# Patient Record
Sex: FEMALE | Race: WHITE
[De-identification: names, ages, dates, MRNs, and addresses within clinical notes are randomized per-mention and may not be internally consistent; named-entity substitution may affect disease eponyms.]

---

## 2017-01-02 ENCOUNTER — HOSPITAL ENCOUNTER (OUTPATIENT)
Dept: HOSPITAL 45 - C.LAB1850 | Age: 82
Discharge: HOME | End: 2017-01-02
Attending: INTERNAL MEDICINE
Payer: COMMERCIAL

## 2017-01-02 DIAGNOSIS — I48.91: ICD-10-CM

## 2017-01-02 DIAGNOSIS — I50.9: Primary | ICD-10-CM

## 2017-01-02 LAB
ANION GAP SERPL CALC-SCNC: 8 MMOL/L (ref 3–11)
B-OH-BUTYR SERPL-SCNC: 1.12 MG/DL (ref 0.2–2.81)
BUN SERPL-MCNC: 18 MG/DL (ref 7–18)
BUN/CREAT SERPL: 16.1 (ref 10–20)
CALCIUM SERPL-MCNC: 9.2 MG/DL (ref 8.5–10.1)
CHLORIDE SERPL-SCNC: 99 MMOL/L (ref 98–107)
CO2 SERPL-SCNC: 33 MMOL/L (ref 21–32)
CREAT SERPL-MCNC: 1.1 MG/DL (ref 0.6–1.2)
GLUCOSE SERPL-MCNC: 364 MG/DL (ref 70–99)
MAGNESIUM SERPL-MCNC: 2.1 MG/DL (ref 1.8–2.4)
POTASSIUM SERPL-SCNC: 4.1 MMOL/L (ref 3.5–5.1)
SODIUM SERPL-SCNC: 140 MMOL/L (ref 136–145)

## 2017-01-19 ENCOUNTER — HOSPITAL ENCOUNTER (OUTPATIENT)
Dept: HOSPITAL 45 - C.CATH | Age: 82
Discharge: HOME | End: 2017-01-19
Attending: INTERNAL MEDICINE
Payer: COMMERCIAL

## 2017-01-19 DIAGNOSIS — E11.9: ICD-10-CM

## 2017-01-19 DIAGNOSIS — Z53.8: ICD-10-CM

## 2017-01-19 DIAGNOSIS — I10: ICD-10-CM

## 2017-01-19 DIAGNOSIS — I48.91: Primary | ICD-10-CM

## 2017-01-23 ENCOUNTER — HOSPITAL ENCOUNTER (OUTPATIENT)
Dept: HOSPITAL 45 - C.CATH | Age: 82
Discharge: HOME | End: 2017-01-23
Attending: INTERNAL MEDICINE
Payer: COMMERCIAL

## 2017-01-23 VITALS
OXYGEN SATURATION: 93 % | TEMPERATURE: 97.34 F | HEART RATE: 124 BPM | DIASTOLIC BLOOD PRESSURE: 125 MMHG | SYSTOLIC BLOOD PRESSURE: 170 MMHG

## 2017-01-23 VITALS — DIASTOLIC BLOOD PRESSURE: 98 MMHG | OXYGEN SATURATION: 92 % | SYSTOLIC BLOOD PRESSURE: 139 MMHG | HEART RATE: 121 BPM

## 2017-01-23 VITALS — DIASTOLIC BLOOD PRESSURE: 115 MMHG | HEART RATE: 130 BPM | SYSTOLIC BLOOD PRESSURE: 185 MMHG | OXYGEN SATURATION: 99 %

## 2017-01-23 VITALS
WEIGHT: 200.62 LBS | HEIGHT: 67.99 IN | BODY MASS INDEX: 30.41 KG/M2 | BODY MASS INDEX: 30.41 KG/M2 | HEIGHT: 67.99 IN | WEIGHT: 200.62 LBS | BODY MASS INDEX: 30.41 KG/M2

## 2017-01-23 VITALS — DIASTOLIC BLOOD PRESSURE: 78 MMHG | SYSTOLIC BLOOD PRESSURE: 135 MMHG | HEART RATE: 71 BPM | OXYGEN SATURATION: 92 %

## 2017-01-23 VITALS — DIASTOLIC BLOOD PRESSURE: 93 MMHG | OXYGEN SATURATION: 93 % | HEART RATE: 62 BPM | SYSTOLIC BLOOD PRESSURE: 166 MMHG

## 2017-01-23 VITALS — HEART RATE: 127 BPM | SYSTOLIC BLOOD PRESSURE: 160 MMHG | OXYGEN SATURATION: 99 % | DIASTOLIC BLOOD PRESSURE: 128 MMHG

## 2017-01-23 VITALS — DIASTOLIC BLOOD PRESSURE: 119 MMHG | OXYGEN SATURATION: 99 % | SYSTOLIC BLOOD PRESSURE: 166 MMHG | HEART RATE: 130 BPM

## 2017-01-23 DIAGNOSIS — Z79.899: ICD-10-CM

## 2017-01-23 DIAGNOSIS — I48.91: Primary | ICD-10-CM

## 2017-01-23 DIAGNOSIS — Z87.891: ICD-10-CM

## 2017-01-23 DIAGNOSIS — E11.9: ICD-10-CM

## 2017-01-23 DIAGNOSIS — I42.9: ICD-10-CM

## 2017-01-23 DIAGNOSIS — I50.21: ICD-10-CM

## 2017-01-23 DIAGNOSIS — I10: ICD-10-CM

## 2017-01-23 NOTE — CARDIOVERSION
DATE OF OPERATION:  01/23/2017

 

PRIMARY CARE PHYSICIAN:  Dr. Polanco.

 

ORDERING PHYSICIAN:  Fleix Farr MD

 

PROCEDURE:  Electrical cardioversion.

 

INDICATIONS:  Atrial fibrillation with rapid ventricular response.

 

SEDATION:  Provided by Dr. Villegas of anesthesiology.

 

CONSENT:  Informed and written consent was obtained prior to performing the

procedure.

 

PROCEDURE DETAIL:  After a time-out procedure was performed, a

transesophageal echo was done to evaluate for left atrial appendage thrombus.

 No thrombus was visualized.  In a synchronized fashion, cardioversion was

attempted at 200 joules unsuccessfully.  Another attempt at 360 joules was

unsuccessful.  Once again in the synchronized fashion, 360 joules were

utilized and successfully converted her to sinus rhythm.  She tolerated the

procedure well and remained hemodynamically stable throughout the procedure. 

She was reminded to remain on anticoagulation therapy and her beta-blocker

was titrated due to her cardiomyopathy.  Diltiazem was discontinued.

 

She will follow up in the office in the next 2-4 weeks to reevaluate rhythm.

 

 

I attest to the content of the Intraoperative Record and any orders documented 
therein. Any exceptions are noted below.

 

 

 

ONEIL

## 2017-01-23 NOTE — DISCHARGE INSTRUCTIONS
Discharge Instructions


Visit


Reason for Visit:  LISS and cardioversion for atrial fibrillation.





Discharge


Discharge Diagnosis / Problem:  Atrial fibrillation with rapid ventricular 

response.





Discharge Goals


Goal(s):  Diagnostic testing, Therapeutic intervention





Medications


Restart Stopped Medication(s):


Very important to start on Xarelto as prescribed.





Activity Recommendations


Activity Limitations:  as noted below





Anesthesia


.





Post Anesthesia Instructions:





If you have had General Anesthesia or IV Sedation:





*  Do not drive today.


*  Resume driving when surgeon permits.


*  Do not make important decisions or sign legal documents today.


*  Call surgeon for:





   1.  Temperature elevations greater than 101 degrees F.


   2.  Uncontrollable pain.


   3.  Excessive bleeding.


   4.  Persistent nausea and vomiting.


   5.  Medication intolerance (nausea, vomiting or rash).





*  For nausea and vomiting use only clear liquids such as: tea, soda, bouillon 

until nausea subsides, then gradually increase diet as tolerated.





*  If you have any concerns or questions, call your surgeon's office.  If 

physician is unavailable and it is an emergency, call 911 or go to the nearest 

emergency room.





.





Instructions / Follow-Up


Instructions / Follow-Up


ACTIVITY RECOMMENDATIONS:





*  May resume driving tomorrow.








SPECIAL CARE:





*  May apply burn ointment for skin irritation.





*  Please contact physician for any lightheadedness, dizziness or palpitations.





ACTIVITY RECOMMENDATIONS:





Resume activities as tolerated with no limitations unless specified.





__ No lifting over _10_ pounds for 24 hours.





__ Do not engage in vigorous exercise, sexual activity, or sports for 24 hours.





__ Do not drive or operate any motorized equipment for 24 hours.





__ You may return to work/school tomorrow.





__ Nothing to eat or drink until gag reflex returns.





__ No HOT or WARM liquids for _8_ hours. 





__ Avoid "scratchy" foods such as potato chips or pretzels for 24 hours 

following procedure.








SPECIAL CARE:





If you experience coughing up or vomiting of blood, contact __[phone]_____





Diet Recommendations


Recommended Home Diet:  low sodium, diabetes diet





Pending Studies


Studies pending at discharge:  yes


List of pending studies:  


Have outpatient echocardiogram done at Dr. Farr's office as scheduled


in February.





Follow up with Dr. Farr as scheduled in February.





Medical Emergencies








.


Who to Call and When:





Medical Emergencies:  If at any time you feel your situation is an emergency, 

please call 911 immediately.





.





Non-Emergent Contact


Non-Emergency issues call your:  Cardiologist





.


.





"Provider Documentation" section prepared by Felix Munguia.

## 2017-01-23 NOTE — CARDIOLOGY PROCEDURE BRIEF NT
Preliminary Cardiology Note


Procedure Date


Jan 23, 2017.





Pre-Procedure Diagnosis


Afib with RVR





Post-Procedure Diagnosis


same. converted to sinus





Procedure(s) Performed





LISS


DC CV





Cardiologist


Chika





Assistant(s)


none





Estimated Blood Loss


none





Preliminary Findings


PRELIM:


No left atrial appendage thrombus.


AFib converted to sinus after 3 shocks (200, 360, 360 J) in synchronized 

fashion.





Sedation by Dr. Mix (Anesthesiology).





Recommendations


Continue anticoagulation.





Specimens





none





Complication(s)


None





Disposition

## 2017-01-24 NOTE — TEE
*NOTICE TO RECEIVING PARTY AGENCY**  This information is strictly Confidential and protected under 
[place] law.  [place] law prohibits you from making any further disclosure of this 
information unless further disclosure is expressly permitted by the written consent of the person to 
whom it pertains or is authorized by law.  A general authorization for the release of medical or 
other information is not sufficient for this purpose.  Hospital accepts no responsibility if the 
information is made available to any other person, INCLUDING THE PATIENT.



Interpretation Summary

  *  Name: ERNESTO YBARRA  Study Date: 2017 07:26 AM  BP: 144/98 mmHg

  *  MRN: [id number]  Patient Location: Cardiac Cath Lab Holding area  HR: 98

  *  : 1935 (M/d/yyyy)  Gender: Female  Height: 68 in

  *  Age: 81 yrs  Ethnicity: [place]  Weight: 205 lb

  *  Referring Physician: Felix Farr MD

  *  Performed By: KILLIAN Crystal RCS

  *  Accession# [id number]  Account# [id number]

  *  Reason For Study: A-FIB, Eval for CSOE

  *  BSA: 2.1 m2

  *  -- Conclusions --

  *  LISS:

  *  1. Normal left ventricular size with moderately reduced systolic function.  Estimated EF 
35-40%.  Global hypokinesis.  Mild concentric left ventricular hypertrophy.

  *  2. The left atrium is mildly dilated.

  *  3. Spontaneous contrast in left atrial appendage.

  *  4. No thrombus is detected in the left atrial appendage.

  *  5. The right atrium is moderately dilated.

  *  6. There is mild mitral regurgitation.

  *  7. There is mild to moderate tricuspid regurgitation.

  *  8. Rhythm is atrial fibrillation with rapid ventricular response.

Procedure Details

  *  LISS Probe #1 utilized for procedure.

  *  The study was performed in Cardiac Catheterization Lab.

  *  Time out was conducted by the physician, nurse, and echo tech with positive identification of 
patient and procedure.

  *  Informed consent for Transesophageal Echocardiogram was obtained prior to the procedure.

  *  An intravenous line was placed. A topical anesthetic agent was used for oropharangeal 
anesthesia. A bite block was inserted.

  *  Sedation performed by the anesthesia department.

  *  The patient's vital signs, including blood pressure, heart rate, pulse oximetry and cardiac 
rhythm were monitored throughout the procedure .

  *  A multifrequency, multiplane transesopheageal echocardiographic endoscope was inserted and 
manipulated in the standard fashion to achieve multiplane views.

  *  The transesophageal probe was passed without difficulty.

  *  A 2D transesophageal echocardiogram with spectral and color flow Doppler was performed.

  *  The usual views were obtained; basal, mid-esophageal, transgastric and aortic views.

  *  The patient tolerated the procedure well without evidence of orophangeal or esophageal trauma.

  *  A 2D transesophageal echocardiogram was performed.

  *  A 2D transesophageal echocardiogram with color flow Doppler was performed.

  *  A 2D transesophageal echocardiogram with Doppler and color flow Doppler was performed.

Left Ventricle

  *  Normal left ventricular size with moderately reduced systolic function.  Estimated EF 35-40%.  
Global hypokinesis.  Mild concentric left ventricular hypertrophy.

Right Ventricle

  *  The right ventricle is normal in size and function.

Atria

  *  The left atrium is mildly dilated.

  *  Spontaneous contrast in left atrial appendage.

  *  No thrombus is detected in the left atrial appendage.

  *  The right atrium is moderately dilated.

  *  No visualized ASD or PFO via color Doppler or 2D imaging.

Mitral Valve

  *  The mitral valve is normal in structure and function.

  *  There is no mitral valve stenosis.

  *  There is mild mitral regurgitation.

Tricuspid Valve

  *  The tricuspid valve is not well visualized, but is grossly normal.

  *  There is no tricuspid stenosis.

  *  There is mild to moderate tricuspid regurgitation.

Aortic Valve

  *  The aortic valve is trileaflet.

  *  The aortic valve is normal in structure and function.

  *  No hemodynamically significant valvular aortic stenosis.

  *  No aortic regurgitation is present.

Pulmonic Valve

  *  The pulmonic valve is not well seen, but is grossly normal.

  *  There is no significant pulmonary regurgitation.

  *  Blunted pulmonary venous flow pattern.

Great Vessels

  *  The aortic root is normal size.

  *  Ascending aorta of normal dimension

  *  Mild atherosclerotic disease of visualized portions of thoracic aorta.

Pericardium

  *  There is no pericardial effusion.



MMode 2D Measurements and Calculations

IVSd 1.2 cm



LVIDd 3.5 cm

LVIDs 2.9 cm

LVPWd 1.3 cm



IVS/LVPW 0.94 

FS 17.6 %

EDV(Teich) 49.7 ml

ESV(Teich) 31.1 ml

EF(Teich) 37.5 %



EDV(cubed) 41.7 ml

ESV(cubed) 23.3 ml

EF(cubed) 44.0 %





LV mass(C)d 143.9 grams

LV mass(C)dI 69.7 grams/m\S\2



SV(Teich) 18.7 ml

SI(Teich) 9.0 ml/m\S\2

SV(cubed) 18.3 ml

SI(cubed) 8.9 ml/m\S\2



Ao root diam 3.6 cm

Ao root area 10.4 cm\S\2



asc Aorta Diam 2.9 cm





LVOT diam 2.0 cm

LVOT area 3.1 cm\S\2













Doppler Measurements and Calculations

MV E max vanessa 115.2 cm/sec



MV dec time 0.17 sec



MR max vanessa 352.9 cm/sec

MR max PG 49.8 mmHg



TR max vanessa 204.8 cm/sec

## 2017-02-13 ENCOUNTER — HOSPITAL ENCOUNTER (INPATIENT)
Dept: HOSPITAL 45 - C.2E | Age: 82
LOS: 4 days | Discharge: HOME | DRG: 308 | End: 2017-02-17
Attending: HOSPITALIST | Admitting: FAMILY MEDICINE
Payer: COMMERCIAL

## 2017-02-13 VITALS
OXYGEN SATURATION: 96 % | SYSTOLIC BLOOD PRESSURE: 146 MMHG | HEART RATE: 136 BPM | TEMPERATURE: 97.7 F | DIASTOLIC BLOOD PRESSURE: 118 MMHG

## 2017-02-13 VITALS
HEART RATE: 76 BPM | TEMPERATURE: 98.42 F | SYSTOLIC BLOOD PRESSURE: 131 MMHG | OXYGEN SATURATION: 96 % | DIASTOLIC BLOOD PRESSURE: 63 MMHG

## 2017-02-13 VITALS
OXYGEN SATURATION: 95 % | TEMPERATURE: 98.06 F | DIASTOLIC BLOOD PRESSURE: 96 MMHG | SYSTOLIC BLOOD PRESSURE: 154 MMHG | HEART RATE: 113 BPM

## 2017-02-13 VITALS
WEIGHT: 212.75 LBS | HEIGHT: 68 IN | HEIGHT: 68 IN | BODY MASS INDEX: 32.24 KG/M2 | WEIGHT: 212.75 LBS | BODY MASS INDEX: 32.24 KG/M2 | BODY MASS INDEX: 32.24 KG/M2

## 2017-02-13 VITALS — OXYGEN SATURATION: 95 %

## 2017-02-13 DIAGNOSIS — I10: ICD-10-CM

## 2017-02-13 DIAGNOSIS — I95.89: ICD-10-CM

## 2017-02-13 DIAGNOSIS — I50.23: ICD-10-CM

## 2017-02-13 DIAGNOSIS — I48.91: Primary | ICD-10-CM

## 2017-02-13 DIAGNOSIS — Z87.891: ICD-10-CM

## 2017-02-13 DIAGNOSIS — Z79.01: ICD-10-CM

## 2017-02-13 DIAGNOSIS — I42.9: ICD-10-CM

## 2017-02-13 DIAGNOSIS — E78.5: ICD-10-CM

## 2017-02-13 DIAGNOSIS — E11.9: ICD-10-CM

## 2017-02-13 LAB
ALBUMIN/GLOB SERPL: 0.9 {RATIO} (ref 0.9–2)
ALP SERPL-CCNC: 68 U/L (ref 45–117)
ALT SERPL-CCNC: 38 U/L (ref 12–78)
ANION GAP SERPL CALC-SCNC: 12 MMOL/L (ref 3–11)
AST SERPL-CCNC: 27 U/L (ref 15–37)
B-OH-BUTYR SERPL-SCNC: 1.97 MG/DL (ref 0.2–2.81)
BASOPHILS # BLD: 0.02 K/UL (ref 0–0.2)
BASOPHILS NFR BLD: 0.3 %
BUN SERPL-MCNC: 23 MG/DL (ref 7–18)
BUN/CREAT SERPL: 19.3 (ref 10–20)
CALCIUM SERPL-MCNC: 9.2 MG/DL (ref 8.5–10.1)
CHLORIDE SERPL-SCNC: 99 MMOL/L (ref 98–107)
CO2 SERPL-SCNC: 26 MMOL/L (ref 21–32)
COMPLETE: YES
CREAT CL PREDICTED SERPL C-G-VRATE: 45.6 ML/MIN
CREAT SERPL-MCNC: 1.2 MG/DL (ref 0.6–1.2)
EOSINOPHIL NFR BLD AUTO: 228 K/UL (ref 130–400)
GLOBULIN SER-MCNC: 3.6 GM/DL (ref 2.5–4)
GLUCOSE SERPL-MCNC: 333 MG/DL (ref 70–99)
HCT VFR BLD CALC: 38.4 % (ref 37–47)
IG%: 0.1 %
IMM GRANULOCYTES NFR BLD AUTO: 26.9 %
LYMPHOCYTES # BLD: 1.83 K/UL (ref 1.2–3.4)
MAGNESIUM SERPL-MCNC: 1.8 MG/DL (ref 1.8–2.4)
MCH RBC QN AUTO: 27.3 PG (ref 25–34)
MCHC RBC AUTO-ENTMCNC: 32.3 G/DL (ref 32–36)
MCV RBC AUTO: 84.4 FL (ref 80–100)
MONOCYTES NFR BLD: 9 %
NEUTROPHILS # BLD AUTO: 0.9 %
NEUTROPHILS NFR BLD AUTO: 62.8 %
PMV BLD AUTO: 11.1 FL (ref 7.4–10.4)
POTASSIUM SERPL-SCNC: 4.1 MMOL/L (ref 3.5–5.1)
RBC # BLD AUTO: 4.55 M/UL (ref 4.2–5.4)
SODIUM SERPL-SCNC: 137 MMOL/L (ref 136–145)
TSH SERPL-ACNC: 5.71 UIU/ML (ref 0.3–4.5)
WBC # BLD AUTO: 6.81 K/UL (ref 4.8–10.8)

## 2017-02-13 RX ADMIN — AMIODARONE HYDROCHLORIDE SCH MLS/HR: 1.8 INJECTION, SOLUTION INTRAVENOUS at 20:35

## 2017-02-13 RX ADMIN — INSULIN ASPART SCH UNITS: 100 INJECTION, SOLUTION INTRAVENOUS; SUBCUTANEOUS at 17:36

## 2017-02-13 RX ADMIN — METOPROLOL SUCCINATE SCH MG: 50 TABLET, EXTENDED RELEASE ORAL at 20:33

## 2017-02-13 RX ADMIN — Medication SCH MG: at 20:33

## 2017-02-13 RX ADMIN — INSULIN ASPART SCH UNITS: 100 INJECTION, SOLUTION INTRAVENOUS; SUBCUTANEOUS at 20:35

## 2017-02-13 RX ADMIN — ATORVASTATIN CALCIUM SCH MG: 10 TABLET, FILM COATED ORAL at 20:32

## 2017-02-13 NOTE — DIAGNOSTIC IMAGING REPORT
CHEST ONE VIEW PORTABLE



CLINICAL HISTORY: A fib cardiac arrhythmia



COMPARISON STUDY:  12/15/2016



FINDINGS: Mild stable cardiomegaly. Subtle prominence of the pulmonary

vasculature diminished from the prior exam. Diaphragms smooth but somewhat

flattened. 



IMPRESSION:  Chronic change. Mild stable cardiomegaly. 









Electronically signed by:  Lavelle Holder M.D.

2/13/2017 2:28 PM



Dictated Date/Time:  2/13/2017 2:28 PM

## 2017-02-13 NOTE — HISTORY AND PHYSICAL
History & Physical


Date & Time of Service:


2017 at 13:07


Chief Complaint:


Atrial Fibrillation With Rvr


Primary Care Physician:


Collin Polanco D.O.


History of Present Illness


80 yo F with pmh afib recently diagnosed in Dec 2016 s/p LISS DCCV in 17 

successfully, on anticoagulation, htn, t2dm, hyperlipidemia, systolic chf 

admitted from cardiology office for afib with rvr. Patient reports she has had 

SOB in the past few days, initially attributed to asthma, however, she did 

notice her pulse has been high. She reports no orthopnea or PND. No chest pain, 

no dizziness/lightheadedness/headaches, no abdominal pain, no urinary symptoms. 

She does have exertional dyspnea with minimal exertion which has worsened over 

the past few days and prompted her to visit her cardiologist.





Past Medical/Surgical History


PMH: htn, t2dm, hyperlipidemia, systolic chf ef 35-40%, afib s/p cardioversion 

in 2017





Psh: total hysterectomy 40 years ago





FHx:


-mother: htn, heart disease


- father: htn


- sister: heart disease





SOcial hx: remote smoking hx 40 years ago, 1 pack per week


- no alcohol abuse


- no substance abuse reported





Social History


Smoking Status:  Former Smoker


Drug Use:  none


Marital Status:  


Housing status:  lives with family ( of 51 years)


Occupational Status:  retired





Allergies


Coded Allergies:  


     Oxytetracycline (Unverified  Allergy, Unknown, RASH, 17)


     Polymyxin B (Unverified  Allergy, Unknown, RASH, 17)





Home Medications


Scheduled


Atorvastatin (Lipitor), 10 MG PO QPM


Furosemide (Lasix), 40 MG PO QAM


Magnesium Oxide (Mag-Ox), 400 MG PO BID


Metformin Hcl (Glucophage), 850 MG PO QAM


Metoprolol Succinate (Toprol Xl), 1 TAB PO BID


Rivaroxaban (Xarelto), 20 MG PO QAM





Scheduled PRN


Fluticasone Propionate (Flovent Hfa), 2 PUFFS INH BID PRN for Shortness of 

Breath





Review of Systems


ROS as per HPI. Rest of ROS negative.





Physical Exam


Vital Signs





Vital Signs








  Date Time  Temp Pulse Resp B/P Pulse Ox O2 Delivery O2 Flow Rate FiO2


 


17 12:51 36.5 136 18 146/118 96 Room Air  





    159/118    





HR: 110s-130s irregular


Appears comfortable without any acute cardiorespiratory distress


irreg irreg heart rhythm and rate, no murmurs appreciated, no carotid bruit


bibasilar crackles, good air exchange, no wheezing, no rhonchi


abd soft, nt/nd +BS no organomegaly, no cva tenderness


1+ LE edema


CN 2-12 grossly intact without any facial drooping or focal deficits





Diagnostics


Diagnostic Radiology


* Name: ERNESTO YBARRA  Study Date: 2017 07:26 AM  BP: 144/98 mmHg


* MRN: [id number]  Patient Location: Cardiac Cath Lab Holding area  HR: 98


* : 1935 (M/d/yyyy)  Gender: Female  Height: 68 in


* Age: 81 yrs  Ethnicity: [place]  Weight: 205 lb


* Referring Physician: Felix Farr MD


* Performed By: KILLIAN Crystal RCS


* Accession# [id number]  Account# [id number]


* Reason For Study: A-FIB, Eval for CSOE


* BSA: 2.1 m2


* -- Conclusions --


* LISS:


* 1. Normal left ventricular size with moderately reduced systolic function.  

Estimated EF 35-40%.  Global hypokinesis.  Mild concentric left ventricular 

hypertrophy.


* 2. The left atrium is mildly dilated.


* 3. Spontaneous contrast in left atrial appendage.


* 4. No thrombus is detected in the left atrial appendage.


* 5. The right atrium is moderately dilated.


* 6. There is mild mitral regurgitation.


* 7. There is mild to moderate tricuspid regurgitation.


* 8. Rhythm is atrial fibrillation with rapid ventricular response.


No Pneumothorax, No Hemothorax, No Infiltrate, No Mass, No Effusion, other (

increased pulmonary markings)





Impression


Assessment and Plan


1. Afib with RVR


- s/p cardioversion in 2017


- rate remains uncontrolled


- ekg


- amiodarone loading 150mg


- amio drip thereafter


- cbc, bmp, mag, serial troponins


- cont xarelto


- cardiology consulte: Dr. Spain 


- tele monitoring





2. Acute on chronic systolic CHF


- 2/2 uncontrolled afib


- aim for rate-control


- cxr


- cont lasix 40 mg daily (missed today's dose)





3. HTN


- BP acceptable and stable


- will cont toprol from outpatient regimen





4. T2DM


- will hold metformin  daily


- glycemic control consult





5. Hyperlipidemia


- cont atorvastatin 10mg nightly





6. No DVT ppx, already on anticoagulation





VTE Prophylaxis


VTE Risk Assessment Done? Y/N:  No

## 2017-02-13 NOTE — PHARMACY PROGRESS NOTE
Glycemic Control Intl Consult


Date of Service


Feb 13, 2017.





Scope


Glycemic Pharmacist consulted by Dr. Pereira on 2/13/17 for glycemic control and 

to write orders per [facility] inpatient glycemic control protocol





Objective


Weight (Kilograms):  100.450


Accuchecks BSG (last 24hrs):











Test


  2/13/17


13:56








Laboratory Data (last 24hrs)











Test


  2/13/17


13:56











Recent Pertinent Medications


Outpatient Anti-diabetic Regimen: 


* Metformin 850mg po qAM 


* A1c = 8.7 %  12/15/16





Risk Factors for Insulin Resistance:


* IVF: Amiodarone gtt (in D5)


* Diet: Thien/DM2/AHA/Fluid restriction





Assessment & Plan


ASSESSMENT:


* ADA & AACE recommend a goal blood sugar range 140-180 mg/dl for the majority 

of critically ill & non-critically ill patients.  However, more stringent 

targets may be selected in individual cases.





* 80 yo admitted for Afib with RVR.


* A1c from previous admission in December 2016 was 8.7% indicating poorly 

controlled blood sugars as an outpatient.  An appropriate target A1c for this 

individual is less than 8.0% due to advanced age and comorbidities.


* Hold Metformin upon admission d/t increased risk for drug interactions, 

difficulty titrating in the inpatient setting, and variability of acute 

setting.  Will consider restarting 1-2 days prior to discharge if renal 

function is okay.  


* Will start basal/bolus SQ regimen using weight based dosing.  I suspect pt 

will be sensitive to insulin dosing.  I believe basal insulin is warranted 

based upon previous admission BSGs and A1c.  








PLAN FOR INPATIENT GLYCEMIC CONTROL:


* Start Lantus qHS:


 * 0 units for BSG below 100 mg/dl


 * 6 units for  mg/dl to 120 mg/dl


 * 12 units for  mg/dl and above


 


* Novolog ACHS


 * Set correction factor to 30 mg/dl/unit


 * Set carb ratio to 1 unit per 10 grams CHO consumed


 


* Set goal range to Low 140 mg/dL - High 180 mg/dL for advanced age





* Please note that the plan above was derived based on current level of insulin 

resistance and hospital stress. These recommendations are appropriate for 

inpatient admission only. Plan of care upon discharge will need to be 

reassessed to avoid potential outpatient hypo/hyperglycemia. 





Thank you.

## 2017-02-14 VITALS
OXYGEN SATURATION: 92 % | HEART RATE: 107 BPM | TEMPERATURE: 97.52 F | SYSTOLIC BLOOD PRESSURE: 136 MMHG | DIASTOLIC BLOOD PRESSURE: 95 MMHG

## 2017-02-14 VITALS
SYSTOLIC BLOOD PRESSURE: 148 MMHG | OXYGEN SATURATION: 98 % | TEMPERATURE: 98.24 F | DIASTOLIC BLOOD PRESSURE: 99 MMHG | HEART RATE: 108 BPM

## 2017-02-14 VITALS
DIASTOLIC BLOOD PRESSURE: 92 MMHG | HEART RATE: 105 BPM | TEMPERATURE: 97.88 F | OXYGEN SATURATION: 96 % | SYSTOLIC BLOOD PRESSURE: 146 MMHG

## 2017-02-14 VITALS
OXYGEN SATURATION: 98 % | DIASTOLIC BLOOD PRESSURE: 98 MMHG | HEART RATE: 76 BPM | TEMPERATURE: 98.24 F | SYSTOLIC BLOOD PRESSURE: 152 MMHG

## 2017-02-14 VITALS
TEMPERATURE: 97.88 F | SYSTOLIC BLOOD PRESSURE: 130 MMHG | DIASTOLIC BLOOD PRESSURE: 84 MMHG | HEART RATE: 108 BPM | OXYGEN SATURATION: 94 %

## 2017-02-14 VITALS
OXYGEN SATURATION: 96 % | TEMPERATURE: 98.06 F | HEART RATE: 94 BPM | DIASTOLIC BLOOD PRESSURE: 89 MMHG | SYSTOLIC BLOOD PRESSURE: 137 MMHG

## 2017-02-14 VITALS — OXYGEN SATURATION: 98 %

## 2017-02-14 RX ADMIN — AMIODARONE HYDROCHLORIDE SCH MLS/HR: 1.8 INJECTION, SOLUTION INTRAVENOUS at 20:36

## 2017-02-14 RX ADMIN — INSULIN GLARGINE SCH UNIT: 100 INJECTION, SOLUTION SUBCUTANEOUS at 08:50

## 2017-02-14 RX ADMIN — ATORVASTATIN CALCIUM SCH MG: 10 TABLET, FILM COATED ORAL at 20:37

## 2017-02-14 RX ADMIN — AMIODARONE HYDROCHLORIDE SCH MLS/HR: 1.8 INJECTION, SOLUTION INTRAVENOUS at 08:50

## 2017-02-14 RX ADMIN — SACUBITRIL AND VALSARTAN SCH TAB: 24; 26 TABLET, FILM COATED ORAL at 21:34

## 2017-02-14 RX ADMIN — RIVAROXABAN SCH MG: 20 TABLET, FILM COATED ORAL at 08:47

## 2017-02-14 RX ADMIN — Medication SCH MG: at 08:47

## 2017-02-14 RX ADMIN — FUROSEMIDE SCH MG: 40 TABLET ORAL at 08:48

## 2017-02-14 RX ADMIN — INSULIN ASPART SCH UNITS: 100 INJECTION, SOLUTION INTRAVENOUS; SUBCUTANEOUS at 12:03

## 2017-02-14 RX ADMIN — INSULIN ASPART SCH UNITS: 100 INJECTION, SOLUTION INTRAVENOUS; SUBCUTANEOUS at 20:36

## 2017-02-14 RX ADMIN — METOPROLOL SUCCINATE SCH MG: 50 TABLET, EXTENDED RELEASE ORAL at 08:47

## 2017-02-14 RX ADMIN — INSULIN ASPART SCH UNITS: 100 INJECTION, SOLUTION INTRAVENOUS; SUBCUTANEOUS at 08:49

## 2017-02-14 RX ADMIN — INSULIN ASPART SCH UNITS: 100 INJECTION, SOLUTION INTRAVENOUS; SUBCUTANEOUS at 16:15

## 2017-02-14 RX ADMIN — INSULIN GLARGINE SCH UNIT: 100 INJECTION, SOLUTION SUBCUTANEOUS at 20:36

## 2017-02-14 RX ADMIN — Medication SCH MG: at 20:37

## 2017-02-14 RX ADMIN — METOPROLOL SUCCINATE SCH MG: 50 TABLET, EXTENDED RELEASE ORAL at 20:37

## 2017-02-14 NOTE — PROGRESS NOTE
Subjective


Date of Service:


Feb 14, 2017.


Subjective


Pt evaluation today including:  conversation w/ patient, conversation w/ family 

( Derik in room)





Problem List


Medical Problems:


(1) Atrial fibrillation with RVR


Status: Acute  





(2) CHF (congestive heart failure)


Status: Acute  





(3) New onset atrial fibrillation


Status: Acute  











Review of Systems


Tele: remains in afib rates 100s-120s.





No chest pain, no sob at rest, no abd pain, no urinary symptoms.


Po intake is good.


All Other Systems:  Reviewed and Negative





Medications











 Medications


  (Trade)  Dose


 Ordered  Sig/Gaby


 Route  Start Time


 Stop Time Status Last Admin


Dose Admin


 


 Atorvastatin


 Calcium


  (Lipitor Tab)  10 mg  QPM


 PO  2/13/17 21:00


 3/15/17 20:59  2/13/17 20:32


10 MG


 


 Furosemide


  (Lasix Tab)  40 mg  QAM


 PO  2/14/17 09:00


 3/16/17 08:59  2/14/17 08:48


40 MG


 


 Magnesium Oxide


  (Mag-Ox Tab)  400 mg  BID


 PO  2/13/17 21:00


 3/15/17 20:59  2/14/17 08:47


400 MG


 


 Rivaroxaban


  (Xarelto Tab)  20 mg  QAM


 PO  2/14/17 09:00


 3/16/17 08:59  2/14/17 08:47


20 MG


 


 Metoprolol


 Succinate 100 mg  100 mg  BID


 PO  2/13/17 21:00


 3/15/17 20:59  2/14/17 08:47


100 MG


 


 Amiodarone HCL/


 Dextrose


  (Nexterone / D5w)  200 ml @ 


 16.7 mls/hr  [id number]


 IV  2/13/17 20:45


 3/15/17 20:44  2/14/17 08:50


16.7 MLS/HR


 


 Insulin Aspart


  (novoLOG ASPART)  SLIDING


 SCALE  ACHS


 SC  2/13/17 16:15


 3/15/17 16:14  2/14/17 12:03


10 UNITS


 


 Insulin Glargine


  (Lantus Solostar


 Pen)  0 UNITS


 FOR BSG


 100 MG...  TODAY@1630  ONCE


 SC  2/13/17 16:30


 2/13/17 16:31 DC 2/13/17 17:37


12 UNIT


 


 Insulin Glargine


  (Lantus Solostar


 Pen)  see


 protocol


 text  BID


 SC  2/14/17 09:00


 3/16/17 08:59  2/14/17 08:50


15 UNIT











Objective


Vital Signs











  Date Time  Temp Pulse Resp B/P Pulse Ox O2 Delivery O2 Flow Rate FiO2


 


2/14/17 12:00      Room Air  


 


2/14/17 11:53 36.8 108 20 148/99 98   


 


2/14/17 08:07 36.8 76 18 152/98 98   


 


2/14/17 08:00      Room Air  


 


2/14/17 04:00      Room Air  


 


2/14/17 03:51 36.6 108 18 130/84 94 Room Air  


 


2/14/17 00:34 36.4 107 19 136/95 92 Room Air  


 


2/13/17 23:59      Room Air  


 


2/13/17 20:00     95 Room Air  


 


2/13/17 19:48 36.7 113 20 154/96 95   


 


2/13/17 16:05 36.9 76 18 131/63 96   


 


2/13/17 16:00      Room Air  











Physical Exam


Comments:


NAD, AOx3


anicteric sclerae, EOMI, PERRL


irreg irreg, tachycardic, no murmurs appreciated


bibasilar crackles, no wheering or rhonchi


abd soft, nt/nd +BS no CVA tenderness, no suprapubic tenderness


1+ LE edema


CN 2-12 grossly intact with no facial drooping or focal deficits





Laboratory Results





Last 24 Hours








Test


  2/13/17


16:19 2/13/17


17:43 2/13/17


20:30 2/13/17


22:20


 


Bedside Glucose 318 mg/dl  324 mg/dl  266 mg/dl  


 


Troponin I    < 0.015 ng/ml 














Test


  2/14/17


02:13 2/14/17


06:20 2/14/17


06:37 2/14/17


11:17


 


Bedside Glucose 164 mg/dl   210 mg/dl  271 mg/dl 


 


Troponin I  < 0.015 ng/ml   











Assessment and Plan


1. Afib with RVR


- s/p successful cardioversion in Jan 2017, now back in Afib


- rate remains uncontrolled


- serial CE negative


- awaiting cardio consult


- remains on amio drip


- cont anticoagulation





2. Acute on chronic systolic CHF


- 2/2 uncontrolled afib


- cxr reviewed


- will give another dose of lasix 40 now





3. HTN


- BP acceptable and stable


- will cont toprol from outpatient regimen


- will give another dose of lasix, should help with volume control





4. T2DM


- will hold metformin  daily


- glycemic control consult appreciated





5. Hyperlipidemia


- cont atorvastatin 10mg nightly





6. No DVT ppx, already on anticoagulation

## 2017-02-14 NOTE — PHARMACY PROGRESS NOTE
Glycemic Control:  Progress Nt


Date of Service


Feb 14, 2017.





Scope


Glycemic Pharmacist consulted by Dr Pereira on 2/13/17 for glycemic control and 

to write orders per [facility] inpatient glycemic control protocol.





Objective


Accuchecks BSG (last 24hrs):











Test


  2/13/17


14:35 2/13/17


16:19 2/13/17


17:43 2/13/17


20:30


 


Random Glucose


  333 mg/dl


(70-99) 


  


  


 


 


Bedside Glucose


  


  318 mg/dl


(70-90) 324 mg/dl


(70-90) 266 mg/dl


(70-90)














Test


  2/14/17


02:13 2/14/17


06:37 


  


 


 


Bedside Glucose


  164 mg/dl


(70-90) 210 mg/dl


(70-90) 


  


 








Laboratory Data (last 24hrs)











Test


  2/13/17


14:35


 


Anion Gap 12.0 mmol/L 


 


BUN/Creatinine Ratio 19.3 


 


Blood Urea Nitrogen 23 mg/dl 


 


Creatinine 1.20 mg/dl 


 


Potassium Level 4.1 mmol/L 


 


Sodium Level 137 mmol/L 


 


White Blood Count 6.81 K/uL 


 


Red Blood Count 4.55 M/uL 


 


Hemoglobin 12.4 g/dL 


 


Hematocrit 38.4 % 


 


Mean Corpuscular Volume 84.4 fL 


 


Mean Corpuscular Hemoglobin 27.3 pg 


 


Mean Corpuscular Hemoglobin


Concent 32.3 g/dl 


 


 


Platelet Count 228 K/uL 


 


Mean Platelet Volume 11.1 fL 


 


Neutrophils (%) (Auto) 62.8 % 


 


Lymphocytes (%) (Auto) 26.9 % 


 


Monocytes (%) (Auto) 9.0 % 


 


Eosinophils (%) (Auto) 0.9 % 


 


Basophils (%) (Auto) 0.3 % 


 


Neutrophils # (Auto) 4.28 K/uL 


 


Lymphocytes # (Auto) 1.83 K/uL 


 


Monocytes # (Auto) 0.61 K/uL 


 


Eosinophils # (Auto) 0.06 K/uL 


 


Basophils # (Auto) 0.02 K/uL 








HbA1c:


8.7% on 12/15/16





Recent Pertinent Medications


Outpatient Anti-diabetic Regimen:


* Metformin 850mg PO QAM











The patient is currently receiving:


* Basal insulin:             Lantus SQ every 24 hours at bedtime- dosing based 

on BSG


 * 0 units for BSG below 100 mg/dl


 * 6 units for  mg/dl to 120 mg/dl


 * 12 units for  mg/dl and above   *** 12 units given last evening ****





* Correctional Insulin:   Novolog Correction per scale ACHS


                          Goal Range: Low 140 mg/dL - High 180 mg/dL


                          Correction Factor: 30 mg/dL/unit





* Prandial insulin:         Per carb ratio of 1 unit per 10 grams CHO consumed








* Oral Agents:            on hold for admission





Assessment & Plan


ASSESSMENT:


* 80yo T2DM female with adequately controlled diabetes as an outpatient per age/

comorbidities. Pt is maintained on metformin as an outpatient. Metformin held 

on admission and pt transitioned to SQ basal bolus insulin regimen for 

inpatient use. Insulin doses were calculated based on weight for an insulin 

naive patient. 


* Pt has received ~40 units of insulin over the past 24hrs


 * 27 units of basal insulin + 13 units of prandial/correctional insulin  


 * BSGs [id number] since starting SQ basal bolus insulin regimen 


 * Expect insulin needs to decrease once steady state is reached with Lantus. 

Will continue BSG range based dosing with Lantus to prevent hypo/hyperglycemia. 


 * BSGs still above goal range - will tighten bolus insulin parameters 


* ADA & AACE recommend a goal blood sugar range 140-180 mg/dl for the majority 

of critically ill & non-critically ill patients.  However, more stringent 

targets may be selected in individual cases.








PLAN FOR INPATIENT GLYCEMIC CONTROL:


* Hold outpatient oral diabetes medications





* Basal insulin with LANTUS SQ  - change to BID dosing. Continue dosing based 

on BSG


 * If BSG below 120mg/dl --> do not give Lantus


 * If -179mg/dl --> give Lantus 10 units


 * If BSG 180mg/dl or above --> Give Lantus 15 units





* Correctional Insulin with NOVOLOG / REGULAR per scale ACHS or Q6hrs while NPO


 * Goal Range:  Low 140 mg/dL - High 180 mg/dL


 * TIGHTEN Correction Factor: 25 mg/dL/unit


 * TIGHTEN Nutritional / Prandial insulin per carb ratio of 1 unit per 8 grams 

CHO consumed








* Please note that the plan above was derived based on current level of insulin 

resistance and hospital stress. These recommendations are appropriate for 

inpatient admission only. Plan of care upon discharge will need to be 

reassessed to avoid potential outpatient hypo/hyperglycemia. 








Thank you.

## 2017-02-14 NOTE — CARDIOLOGY PROGRESS NOTE
DATE: 02/14/2017

 

TIME:  16:16 p.m.

 

SUBJECTIVE:  Mrs. Aguilar was evaluated earlier this morning at

approximately 7:30 a.m.  She states that her breathing is improved compared

to yesterday and now that she is somewhat better rate controlled.  She still

is tachycardic; however.  She did not feel back to baseline.  She did have

dyspnea with exertion while getting out of bed to use the bedside commode. 

She denies shortness of breath at rest.  She denies chest pain, syncope, near

syncope, or palpitations.  No reported bleeding.

 

OBJECTIVE:

VITAL SIGNS:  Temperature 36.6 degrees, heart rate 108 beats per minute,

respiration rate 18, blood pressure 130/84 mmHg, oxygen saturation 94% on

room air.  Her blood pressure after this morning did become more elevated up

to 152/98 mmHg and consistently stayed mildly elevated.  I's and O's are

incomplete but rather even yesterday.  Weight 95 kg.

GENERAL:  No acute distress, alert.

NECK:  No appreciable JVD.

CARDIAC:  No ventricular heave.  Irregularly irregular and tachycardic. 

Normal S1, S2.  No audible murmurs, rubs or gallops.

LUNGS:  Bibasilar rales.

ABDOMEN:  Soft, nontender, nondistended.  Normoactive bowel sounds.

EXTREMITIES:  1+ left lower extremity edema.  Trace to 1+ right lower

extremity edema.  No cyanosis.

PSYCHIATRIC:  Affect appears appropriate.

 

MEDICATIONS:  Include amiodarone drip, atorvastatin 10 mg daily, Lasix 40 mg

p.o. q.a.m., magnesium oxide 400 mg p.o. b.i.d., metoprolol succinate 100 mg

p.o. b.i.d., Xarelto 20 mg daily.

 

LABORATORY DATA:  White blood cell count 6.81, hemoglobin 12.4, platelets

228.  Troponins have been undetectable.  TSH 5.7.  Sodium 137, potassium 4.1,

BUN 23, creatinine 1.2, magnesium 1.8, AST 27, ALT 38.

 

Telemetry personally reviewed.  She remains in atrial fibrillation.  Her

heart rate trend; however, has improved while on amiodarone with improvement

overall heart rate.

 

ECG upon presentation personally reviewed.  This demonstrated atrial

fibrillation with rapid ventricular response, 115 beats per minute. 

Anteroseptal infarct.

 

ASSESSMENT AND PLAN:

1.  Atrial fibrillation with rapid ventricular response:  Heart rate better

controlled on amiodarone drip.  Continue amiodarone drip for loading

purposes.  We will initiate oral dosing at some point during this hospital

stay.  We will reevaluate tomorrow.  Goal at this point is rhythm control. 

She will likely require electrical cardioversion at some point after some

degree of amiodarone loading.  Timing of this will be determined by her

overall response throughout this hospital stay.  Continue anticoagulation for

stroke risk reduction.

2.  Acute on chronic systolic congestive heart failure:  Can continue

outpatient diuretic regimen.  However, if she does not demonstrate

significant diuresis, we would dose an additional dose this afternoon, which

has been ordered by the primary hospitalist, Dr. Pereira.  We would consider

intravenous dosing if she appears hypervolemic tomorrow.  Continue low sodium

diet and daily weights.  Strict I's and O's.

3.  Cardiomyopathy:  Her echocardiogram images were personally reviewed once

again this morning that was done as an outpatient yesterday.  Continue

metoprolol succinate at current dose.  We will initiate low dose Entresto

twice daily.  Etiology may be secondary to tachycardia from her atrial

fibrillation with rapid ventricular response.  If there is no improvement

after the atrial fibrillation is dealt with, we would consider ischemic

evaluation such as cardiac catheterization.

4.  Hypertension:  Initiating Entresto as her blood pressure is becoming more

elevated as the day progressed today.

5.  Disposition:  Plan of care has been discussed with Dr. Pereira. 

Cardiology will continue to follow.

## 2017-02-15 VITALS
DIASTOLIC BLOOD PRESSURE: 60 MMHG | TEMPERATURE: 98.24 F | HEART RATE: 68 BPM | SYSTOLIC BLOOD PRESSURE: 124 MMHG | OXYGEN SATURATION: 99 %

## 2017-02-15 VITALS
TEMPERATURE: 97.52 F | HEART RATE: 106 BPM | DIASTOLIC BLOOD PRESSURE: 98 MMHG | OXYGEN SATURATION: 94 % | SYSTOLIC BLOOD PRESSURE: 120 MMHG

## 2017-02-15 VITALS
OXYGEN SATURATION: 95 % | HEART RATE: 105 BPM | TEMPERATURE: 97.52 F | DIASTOLIC BLOOD PRESSURE: 74 MMHG | SYSTOLIC BLOOD PRESSURE: 121 MMHG

## 2017-02-15 VITALS
DIASTOLIC BLOOD PRESSURE: 88 MMHG | HEART RATE: 86 BPM | TEMPERATURE: 97.88 F | OXYGEN SATURATION: 99 % | SYSTOLIC BLOOD PRESSURE: 101 MMHG

## 2017-02-15 VITALS
SYSTOLIC BLOOD PRESSURE: 122 MMHG | OXYGEN SATURATION: 92 % | HEART RATE: 101 BPM | DIASTOLIC BLOOD PRESSURE: 72 MMHG | TEMPERATURE: 97.52 F

## 2017-02-15 VITALS
HEART RATE: 89 BPM | OXYGEN SATURATION: 92 % | TEMPERATURE: 97.7 F | DIASTOLIC BLOOD PRESSURE: 68 MMHG | SYSTOLIC BLOOD PRESSURE: 105 MMHG

## 2017-02-15 LAB
ANION GAP SERPL CALC-SCNC: 9 MMOL/L (ref 3–11)
BASOPHILS # BLD: 0.02 K/UL (ref 0–0.2)
BASOPHILS NFR BLD: 0.3 %
BUN SERPL-MCNC: 23 MG/DL (ref 7–18)
BUN/CREAT SERPL: 19.1 (ref 10–20)
CALCIUM SERPL-MCNC: 8.4 MG/DL (ref 8.5–10.1)
CHLORIDE SERPL-SCNC: 101 MMOL/L (ref 98–107)
CO2 SERPL-SCNC: 31 MMOL/L (ref 21–32)
COMPLETE: YES
CREAT CL PREDICTED SERPL C-G-VRATE: 44.3 ML/MIN
CREAT SERPL-MCNC: 1.2 MG/DL (ref 0.6–1.2)
EOSINOPHIL NFR BLD AUTO: 201 K/UL (ref 130–400)
GLUCOSE SERPL-MCNC: 133 MG/DL (ref 70–99)
HCT VFR BLD CALC: 36.6 % (ref 37–47)
IG%: 0.3 %
IMM GRANULOCYTES NFR BLD AUTO: 32.6 %
LYMPHOCYTES # BLD: 2.31 K/UL (ref 1.2–3.4)
MAGNESIUM SERPL-MCNC: 1.7 MG/DL (ref 1.8–2.4)
MCH RBC QN AUTO: 27.2 PG (ref 25–34)
MCHC RBC AUTO-ENTMCNC: 31.7 G/DL (ref 32–36)
MCV RBC AUTO: 85.9 FL (ref 80–100)
MONOCYTES NFR BLD: 7.8 %
NEUTROPHILS # BLD AUTO: 2.8 %
NEUTROPHILS NFR BLD AUTO: 56.2 %
PMV BLD AUTO: 11.5 FL (ref 7.4–10.4)
POTASSIUM SERPL-SCNC: 3.3 MMOL/L (ref 3.5–5.1)
RBC # BLD AUTO: 4.26 M/UL (ref 4.2–5.4)
SODIUM SERPL-SCNC: 141 MMOL/L (ref 136–145)
WBC # BLD AUTO: 7.09 K/UL (ref 4.8–10.8)

## 2017-02-15 RX ADMIN — INSULIN ASPART SCH UNITS: 100 INJECTION, SOLUTION INTRAVENOUS; SUBCUTANEOUS at 17:16

## 2017-02-15 RX ADMIN — METOPROLOL SUCCINATE SCH MG: 50 TABLET, EXTENDED RELEASE ORAL at 20:50

## 2017-02-15 RX ADMIN — SACUBITRIL AND VALSARTAN SCH TAB: 24; 26 TABLET, FILM COATED ORAL at 20:50

## 2017-02-15 RX ADMIN — FUROSEMIDE SCH MG: 40 TABLET ORAL at 08:57

## 2017-02-15 RX ADMIN — SACUBITRIL AND VALSARTAN SCH TAB: 24; 26 TABLET, FILM COATED ORAL at 08:55

## 2017-02-15 RX ADMIN — INSULIN ASPART SCH UNITS: 100 INJECTION, SOLUTION INTRAVENOUS; SUBCUTANEOUS at 20:54

## 2017-02-15 RX ADMIN — INSULIN GLARGINE SCH UNIT: 100 INJECTION, SOLUTION SUBCUTANEOUS at 20:54

## 2017-02-15 RX ADMIN — RIVAROXABAN SCH MG: 20 TABLET, FILM COATED ORAL at 08:56

## 2017-02-15 RX ADMIN — INSULIN ASPART SCH UNITS: 100 INJECTION, SOLUTION INTRAVENOUS; SUBCUTANEOUS at 08:49

## 2017-02-15 RX ADMIN — INSULIN GLARGINE SCH UNIT: 100 INJECTION, SOLUTION SUBCUTANEOUS at 08:50

## 2017-02-15 RX ADMIN — Medication SCH MG: at 08:56

## 2017-02-15 RX ADMIN — AMIODARONE HYDROCHLORIDE SCH MLS/HR: 1.8 INJECTION, SOLUTION INTRAVENOUS at 09:02

## 2017-02-15 RX ADMIN — Medication SCH MG: at 20:50

## 2017-02-15 RX ADMIN — AMIODARONE HYDROCHLORIDE SCH MLS/HR: 1.8 INJECTION, SOLUTION INTRAVENOUS at 20:53

## 2017-02-15 RX ADMIN — AMIODARONE HYDROCHLORIDE SCH MG: 200 TABLET ORAL at 08:58

## 2017-02-15 RX ADMIN — AMIODARONE HYDROCHLORIDE SCH MG: 200 TABLET ORAL at 20:50

## 2017-02-15 RX ADMIN — ATORVASTATIN CALCIUM SCH MG: 10 TABLET, FILM COATED ORAL at 20:50

## 2017-02-15 RX ADMIN — METOPROLOL SUCCINATE SCH MG: 50 TABLET, EXTENDED RELEASE ORAL at 08:56

## 2017-02-15 RX ADMIN — INSULIN ASPART SCH UNITS: 100 INJECTION, SOLUTION INTRAVENOUS; SUBCUTANEOUS at 12:21

## 2017-02-15 NOTE — CARDIOLOGY PROGRESS NOTE
DATE: 02/15/2017

 

TIME:  4:00 a.m.

 

SUBJECTIVE:  She states that she feels better than yesterday.  Breathing has

improved.  She was given additional Lasix as noted yesterday.  She denies

palpitations, syncope, near syncope, chest pain or bleeding.  She remains on

amiodarone.

 

OBJECTIVE:

VITAL SIGNS:  Temperature 36.4 degrees, heart rate 101 beats per minute,

respiration rate 18, blood pressure 122/72 mmHg, oxygen saturation 92% on

room air.  I's and O's negative 851 mL yesterday.  She is already negative

nearly 800 mL today.

GENERAL:  No acute distress.  She is alert.

NECK:  No appreciable JVD.

CARDIAC EXAM:  No ventricular heave.  Irregularly irregular and tachycardic. 

No murmurs, rubs or gallops auscultated.

LUNGS:  Clear to auscultation bilaterally without wheezes, rales or rhonchi.

ABDOMEN:  Soft, nontender, nondistended.  Normoactive bowel sounds.

EXTREMITIES:  Trace bilateral lower extremity edema.  No cyanosis.

PSYCHIATRIC:  Affect appears appropriate.

 

MEDICATIONS:  Amiodarone drip, atorvastatin 10 mg daily, Lasix 40 mg daily

p.o., magnesium oxide 400 mg p.o. b.i.d., metoprolol succinate 100 mg p.o.

b.i.d., Entresto 24/26 mg 1 tablet twice daily, Xarelto 20 mg daily.  

 

Telemetry personally reviewed.  Remains in atrial fibrillation with heart

rate mostly 100-120.

 

DATA:  Sodium 141, potassium 3.3, BUN 23, creatinine 1.2, magnesium 1.7,

hemoglobin 11.6, platelets 201.

 

Today's ECG is currently pending.

 

ASSESSMENT AND PLAN:

1.  Atrial fibrillation with rapid ventricular response:  Will initiate

amiodarone 400 mg p.o. b.i.d. today and overlap with amiodarone drip

throughout the day.  Amiodarone drip can be discontinued later tonight as

ordered.  Continue Xarelto for anticoagulation.  She has declined Coumadin or

changing to another agents as she did ask questions about Xarelto today. 

Plan for cardioversion on Friday, on amiodarone therapy.  She should be kept

n.p.o. after midnight tomorrow.  She should have any interruption in her

anticoagulation therapy.  She prefers to be cardioverted during this

hospitalization rather than being discharged home for elective cardioversion.

2.  Acute on chronic systolic congestive heart failure:  Her volume status

has improved.  Continue outpatient regimen of Lasix for today.  Continue low

sodium diet, strict I's and O's and daily weights.

3.  Cardiomyopathy:  Entresto was initiated yesterday.  She is tolerating it

well.  We did discuss this medication today.  Continue metoprolol succinate

at current dose.  Etiology may be secondary to tachycardia.  If no

improvement following heart rate control/rhythm control, then would consider

ischemic evaluation.

4.  Hypertension:  Blood pressure better controlled after additional Lasix

yesterday and the initiation of Entresto.  Continue current regimen.

5.  Disposition:  Cardiology will continue to follow.  She is scheduled for

cardioversion on Friday with anesthesia for sedation as noted above.

## 2017-02-15 NOTE — PROGRESS NOTE
Subjective


Date of Service:


Feb 15, 2017.


Subjective


Pt evaluation today including:  conversation w/ patient, conversation w/ family

, physical exam, conversation w/ consultant, review of inpatient medication list


No chest pain, no sob. Good appetite. 


No abd pain, no urinary symptoms.





Problem List


Medical Problems:


(1) Atrial fibrillation with RVR


Status: Acute  





(2) CHF (congestive heart failure)


Status: Acute  





(3) New onset atrial fibrillation


Status: Acute  











Review of Systems


All Other Systems:  Reviewed and Negative





Medications











 Medications


  (Trade)  Dose


 Ordered  Sig/Gaby


 Route  Start Time


 Stop Time Status Last Admin


Dose Admin


 


 Furosemide


  (Lasix Tab)  40 mg  NOW  ONCE


 PO  2/14/17 15:30


 2/14/17 15:37 DC 2/14/17 15:53


40 MG


 


 Sacubitril/


 Valsartan


  (Entresto 24-26


 Mg)  1 tab  BID


 PO  2/14/17 21:00


 3/16/17 20:59  2/15/17 08:55


1 TAB


 


 Potassium


 Chloride 40 meq  40 meq  NOW  STAT


 PO  2/15/17 07:55


 2/15/17 08:23 DC 2/15/17 08:58


40 MEQ


 


 Magnesium Sulfate/


 Prmx


  (Magnesium


 Sulfate/Premixed


 D5W)  100 ml @ 


 100 mls/hr  0900  ONCE


 IV  2/15/17 09:00


 2/15/17 09:59 DC 2/15/17 08:59


100 MLS/HR


 


 Amiodarone HCl


  (Cordarone Tab)  400 mg  BID


 PO  2/15/17 09:00


 3/17/17 08:59  2/15/17 08:58


400 MG











Objective


Vital Signs











  Date Time  Temp Pulse Resp B/P Pulse Ox O2 Delivery O2 Flow Rate FiO2


 


2/15/17 11:00 36.5 89 16 105/68 92 Room Air  


 


2/15/17 07:30 36.4 106 20 120/98 94 Room Air  


 


2/15/17 04:00      Room Air  


 


2/15/17 03:17 36.4 101 18 122/72 92 Room Air  


 


2/15/17 00:00 36.4 105 18 121/74 95 Room Air  


 


2/14/17 23:59      Room Air  


 


2/14/17 20:07 36.7 94 18 137/89 96   


 


2/14/17 20:00      Room Air  


 


2/14/17 16:01 36.6 105 18 146/92 96   


 


2/14/17 16:00     98 Room Air  


 


2/14/17 12:00      Room Air  


 


2/14/17 11:53 36.8 108 20 148/99 98   











Physical Exam


Comments:


NAD, AOx3


anicteric, eomi, perrl


irreg irreg tachycardic, no appreciable murmurs


abd soft,nt/nd +BS no cva tend


trace LE edema


cn 2-12 grossly intact





Laboratory Results





Last 24 Hours








Test


  2/14/17


16:34 2/14/17


20:16 2/15/17


05:24 2/15/17


05:27


 


Bedside Glucose 169 mg/dl  174 mg/dl   


 


Sodium Level   141 mmol/L  


 


Potassium Level   3.3 mmol/L  


 


Chloride Level   101 mmol/L  


 


Carbon Dioxide Level   31 mmol/L  


 


Anion Gap   9.0 mmol/L  


 


Blood Urea Nitrogen   23 mg/dl  


 


Creatinine   1.20 mg/dl  


 


Est Creatinine Clear Calc


Drug Dose 


  


  44.3 ml/min 


  


 


 


Estimated GFR (


American) 


  


  49.1 


  


 


 


Estimated GFR (Non-


American 


  


  42.4 


  


 


 


BUN/Creatinine Ratio   19.1  


 


Random Glucose   133 mg/dl  


 


Calcium Level   8.4 mg/dl  


 


Magnesium Level   1.7 mg/dl  


 


White Blood Count    7.09 K/uL 


 


Red Blood Count    4.26 M/uL 


 


Hemoglobin    11.6 g/dL 


 


Hematocrit    36.6 % 


 


Mean Corpuscular Volume    85.9 fL 


 


Mean Corpuscular Hemoglobin    27.2 pg 


 


Mean Corpuscular Hemoglobin


Concent 


  


  


  31.7 g/dl 


 


 


Platelet Count    201 K/uL 


 


Mean Platelet Volume    11.5 fL 


 


Neutrophils (%) (Auto)    56.2 % 


 


Lymphocytes (%) (Auto)    32.6 % 


 


Monocytes (%) (Auto)    7.8 % 


 


Eosinophils (%) (Auto)    2.8 % 


 


Basophils (%) (Auto)    0.3 % 


 


Neutrophils # (Auto)    3.99 K/uL 


 


Lymphocytes # (Auto)    2.31 K/uL 


 


Monocytes # (Auto)    0.55 K/uL 


 


Eosinophils # (Auto)    0.20 K/uL 


 


Basophils # (Auto)    0.02 K/uL 


 


RDW Standard Deviation    47.8 fL 


 


RDW Coefficient of Variation    15.2 % 


 


Immature Granulocyte % (Auto)    0.3 % 


 


Immature Granulocyte # (Auto)    0.02 K/uL 














Test


  2/15/17


06:33 2/15/17


11:09 


  


 


 


Bedside Glucose 145 mg/dl  278 mg/dl   














 24-Hour Column 


 


 2/15/17





 08:00


 


Intake Total 249 ml


 


Output Total 1600 ml


 


Balance -1351 ml











Assessment and Plan


1. Afib with RVR


- s/p successful cardioversion in Jan 2017, now back in Afib


- rate better controlled


- amio loading, infusion to stop this evening and switch to oral amio


- cont Xarelto


- scheduled for cardioversion this Friday





2. Acute on chronic systolic CHF


- 2/2 uncontrolled afib


- cxr reviewed


- cont daily lasix 40mg





3. HTN


- BP acceptable and stable


- will cont toprol from outpatient regimen








4. T2DM


- will hold metformin  daily


- glycemic control consult appreciated





5. Hyperlipidemia


- cont atorvastatin 10mg nightly





6. No DVT ppx, already on anticoagulation

## 2017-02-15 NOTE — PHARMACY PROGRESS NOTE
Glycemic Control:  Progress Nt


Date of Service


Feb 15, 2017.





Scope


Glycemic Pharmacist consulted for glycemic control and to write orders per [facility] 

inpatient glycemic control protocol.





Objective


Accuchecks BSG (last 24hrs):











Test


  2/14/17


11:17 2/14/17


16:34 2/14/17


20:16


 


Bedside Glucose


  271 mg/dl


(70-90) 169 mg/dl


(70-90) 174 mg/dl


(70-90)














Test


  2/15/17


05:24 2/15/17


06:33


 


Bedside Glucose


  133 mg/dl


(70-99) 145 mg/dl


(70-90)








Laboratory Data (last 24hrs)











Test


  2/15/17


05:24 2/15/17


05:27


 


Anion Gap 9.0 mmol/L  


 


BUN/Creatinine Ratio 19.1  


 


Blood Urea Nitrogen 23 mg/dl  


 


Creatinine 1.20 mg/dl  


 


Potassium Level 3.3 mmol/L  


 


Sodium Level 141 mmol/L  


 


White Blood Count  7.09 K/uL 


 


Red Blood Count  4.26 M/uL 


 


Hemoglobin  11.6 g/dL 


 


Hematocrit  36.6 % 


 


Mean Corpuscular Volume  85.9 fL 


 


Mean Corpuscular Hemoglobin  27.2 pg 


 


Mean Corpuscular Hemoglobin


Concent 


  31.7 g/dl 


 


 


Platelet Count  201 K/uL 


 


Mean Platelet Volume  11.5 fL 


 


Neutrophils (%) (Auto)  56.2 % 


 


Lymphocytes (%) (Auto)  32.6 % 


 


Monocytes (%) (Auto)  7.8 % 


 


Eosinophils (%) (Auto)  2.8 % 


 


Basophils (%) (Auto)  0.3 % 


 


Neutrophils # (Auto)  3.99 K/uL 


 


Lymphocytes # (Auto)  2.31 K/uL 


 


Monocytes # (Auto)  0.55 K/uL 


 


Eosinophils # (Auto)  0.20 K/uL 


 


Basophils # (Auto)  0.02 K/uL 








HbA1c:


8.7% on 12/15/16





Recent Pertinent Medications


Outpatient Anti-diabetic Regimen:


* Metformin 850mg PO QAM











The patient is currently receiving:


* Basal insulin:             Lantus SQ every12 hours- dosing based on BSG d/t 

insulin naive patient 


 * 0 units for BSG below 120mg/dl


 * 10 units for  mg/dl to 179 mg/dl


 * 15 units for  mg/dl and above  





* Correctional Insulin:   Novolog Correction per scale ACHS


                          Goal Range: Low 140 mg/dL - High 180 mg/dL


                          Correction Factor: 25 mg/dL/unit





* Prandial insulin:         Per carb ratio of 1 unit per 8 grams CHO consumed








* Oral Agents:            on hold for admission





Assessment & Plan


ASSESSMENT:


* 82yo T2DM female with adequately controlled diabetes as an outpatient per age/

comorbidities. Pt is maintained on metformin as an outpatient. Metformin held 

on admission and pt transitioned to SQ basal bolus insulin regimen for 

inpatient use. Insulin doses were calculated based on weight for an insulin 

naive patient. 


* Pt has received ~45 units of insulin over the past 24hrs


 * 25 units of basal insulin + 20 units of prandial/correctional insulin  


 * BSGs [phone] since starting SQ basal bolus insulin regimen 


 * BSGs are trending downwards over the last 24hrs. Expect insulin needs to 

decrease once steady state is reached with Lantus. Will continue BSG range 

based dosing with Lantus to prevent hypo/hyperglycemia, but will titrate 

parameters to make dosing less aggressive. 


* ADA & AACE recommend a goal blood sugar range 140-180 mg/dl for the majority 

of critically ill & non-critically ill patients.  However, more stringent 

targets may be selected in individual cases.








PLAN FOR INPATIENT GLYCEMIC CONTROL:


* Hold outpatient oral diabetes medications





* DECREASE Basal insulin with LANTUS SQ  BID dosing. Increase BSG at which 

doses are given. Less stringent glycemic control needed in elderly patient. 


 * If BSG 120mg/dl or below--> do not give Lantus


 * If -199mg/dl --> give Lantus 10 units


 * If BSG 200mg/dl or above --> Give Lantus 15 units





* Continue Correctional Insulin with NOVOLOG per scale ACHS or Q6hrs while NPO


 * Goal Range:  Low 140 mg/dL - High 180 mg/dL


 * Correction Factor: 25 mg/dL/unit


 * Nutritional / Prandial insulin per carb ratio of 1 unit per 8 grams CHO 

consumed








* Please note that the plan above was derived based on current level of insulin 

resistance and hospital stress. These recommendations are appropriate for 

inpatient admission only. Plan of care upon discharge will need to be 

reassessed to avoid potential outpatient hypo/hyperglycemia. 








Thank you.








Looking ahead to discharge:


* A1c is near goal range based on age and co-morbidities. No changes needed to 

outpatient regimen at this time. 


* Pt is maintained on metformin. New renal impairment dosing recommendations 

state that it is OK to continue metformin if eGFR falls below 45ml/min if a 

patient is stable on the medication. Pt is on "low" dose (850mg) metformin. If 

eGRF falls below 30ml/min may consider changing to another agent vs lowering 

the dose further.

## 2017-02-16 VITALS
HEART RATE: 83 BPM | OXYGEN SATURATION: 93 % | SYSTOLIC BLOOD PRESSURE: 117 MMHG | DIASTOLIC BLOOD PRESSURE: 70 MMHG | TEMPERATURE: 97.7 F

## 2017-02-16 VITALS
OXYGEN SATURATION: 93 % | TEMPERATURE: 98.06 F | DIASTOLIC BLOOD PRESSURE: 65 MMHG | SYSTOLIC BLOOD PRESSURE: 113 MMHG | HEART RATE: 110 BPM

## 2017-02-16 VITALS
TEMPERATURE: 97.34 F | SYSTOLIC BLOOD PRESSURE: 113 MMHG | OXYGEN SATURATION: 93 % | HEART RATE: 98 BPM | DIASTOLIC BLOOD PRESSURE: 68 MMHG

## 2017-02-16 VITALS
SYSTOLIC BLOOD PRESSURE: 109 MMHG | DIASTOLIC BLOOD PRESSURE: 73 MMHG | OXYGEN SATURATION: 93 % | HEART RATE: 98 BPM | TEMPERATURE: 98.6 F

## 2017-02-16 VITALS
HEART RATE: 94 BPM | OXYGEN SATURATION: 96 % | TEMPERATURE: 98.06 F | DIASTOLIC BLOOD PRESSURE: 83 MMHG | SYSTOLIC BLOOD PRESSURE: 124 MMHG

## 2017-02-16 VITALS
SYSTOLIC BLOOD PRESSURE: 132 MMHG | DIASTOLIC BLOOD PRESSURE: 79 MMHG | TEMPERATURE: 98.24 F | OXYGEN SATURATION: 95 % | HEART RATE: 104 BPM

## 2017-02-16 VITALS
DIASTOLIC BLOOD PRESSURE: 76 MMHG | TEMPERATURE: 97.16 F | SYSTOLIC BLOOD PRESSURE: 106 MMHG | HEART RATE: 106 BPM | OXYGEN SATURATION: 94 %

## 2017-02-16 LAB
ANION GAP SERPL CALC-SCNC: 9 MMOL/L (ref 3–11)
BUN SERPL-MCNC: 26 MG/DL (ref 7–18)
BUN/CREAT SERPL: 24 (ref 10–20)
CALCIUM SERPL-MCNC: 8.7 MG/DL (ref 8.5–10.1)
CHLORIDE SERPL-SCNC: 102 MMOL/L (ref 98–107)
CO2 SERPL-SCNC: 30 MMOL/L (ref 21–32)
CREAT CL PREDICTED SERPL C-G-VRATE: 48.6 ML/MIN
CREAT CL PREDICTED SERPL C-G-VRATE: 48.6 ML/MIN
CREAT SERPL-MCNC: 1.1 MG/DL (ref 0.6–1.2)
CREAT SERPL-MCNC: 1.1 MG/DL (ref 0.6–1.2)
GLUCOSE SERPL-MCNC: 138 MG/DL (ref 70–99)
MAGNESIUM SERPL-MCNC: 2.1 MG/DL (ref 1.8–2.4)
POTASSIUM SERPL-SCNC: 3.5 MMOL/L (ref 3.5–5.1)
SODIUM SERPL-SCNC: 141 MMOL/L (ref 136–145)

## 2017-02-16 RX ADMIN — ATORVASTATIN CALCIUM SCH MG: 10 TABLET, FILM COATED ORAL at 20:47

## 2017-02-16 RX ADMIN — METOPROLOL SUCCINATE SCH MG: 50 TABLET, EXTENDED RELEASE ORAL at 07:57

## 2017-02-16 RX ADMIN — INSULIN ASPART SCH UNITS: 100 INJECTION, SOLUTION INTRAVENOUS; SUBCUTANEOUS at 17:29

## 2017-02-16 RX ADMIN — INSULIN ASPART SCH UNITS: 100 INJECTION, SOLUTION INTRAVENOUS; SUBCUTANEOUS at 07:54

## 2017-02-16 RX ADMIN — Medication SCH MG: at 07:58

## 2017-02-16 RX ADMIN — INSULIN ASPART SCH UNITS: 100 INJECTION, SOLUTION INTRAVENOUS; SUBCUTANEOUS at 13:35

## 2017-02-16 RX ADMIN — Medication SCH MG: at 20:48

## 2017-02-16 RX ADMIN — RIVAROXABAN SCH MG: 20 TABLET, FILM COATED ORAL at 07:58

## 2017-02-16 RX ADMIN — INSULIN GLARGINE SCH UNIT: 100 INJECTION, SOLUTION SUBCUTANEOUS at 20:49

## 2017-02-16 RX ADMIN — SACUBITRIL AND VALSARTAN SCH TAB: 24; 26 TABLET, FILM COATED ORAL at 07:58

## 2017-02-16 RX ADMIN — AMIODARONE HYDROCHLORIDE SCH MG: 200 TABLET ORAL at 07:57

## 2017-02-16 RX ADMIN — INSULIN GLARGINE SCH UNIT: 100 INJECTION, SOLUTION SUBCUTANEOUS at 07:55

## 2017-02-16 RX ADMIN — SACUBITRIL AND VALSARTAN SCH TAB: 24; 26 TABLET, FILM COATED ORAL at 20:48

## 2017-02-16 RX ADMIN — AMIODARONE HYDROCHLORIDE SCH MG: 200 TABLET ORAL at 20:48

## 2017-02-16 RX ADMIN — METOPROLOL SUCCINATE SCH MG: 50 TABLET, EXTENDED RELEASE ORAL at 20:47

## 2017-02-16 RX ADMIN — INSULIN ASPART SCH UNITS: 100 INJECTION, SOLUTION INTRAVENOUS; SUBCUTANEOUS at 20:39

## 2017-02-16 RX ADMIN — FUROSEMIDE SCH MG: 40 TABLET ORAL at 07:58

## 2017-02-16 NOTE — PHARMACY PROGRESS NOTE
Glycemic: Assessment & Plan


Date of Service


Feb 16, 2017.





Assessment & Plan


Assessment


* Patient scheduled for NPO after 0000 tonight for anticipated cardioversion 

tomorrow.  Prolonged NPO unlikely.


 * Will therefore decrease Lantus very slightly (80%) x1 tomorrow AM.  Will 

then resume at previous.


* BSG to 278 mg/dL at lunch yesterday - addressed by tightening breakfast 

Novolog only


* BSG to 223 mg/dL at HS yesterday - no change as this was 2nd pt's refusal of 

insulin at dinner





Plan





* Basal insulin:             Adjust x1 Lantus BID based on BSG


            0 units for BSG < 120 mg/dL


            10 units for -179 mg/dL (8 units 2/17 AM only)


            15 units for  mg/dL or greater (12 units 2/17 AM only)





* Correctional Insulin:   Novolog Correction per scale ACHS


                         Goal Range: Low 140 mg/dL - High 180 mg/dL


                         Correction Factor: 25 mg/dL/unit (20 mg/dL/unit at 

breakfast)





* Prandial insulin:         Per carb ratio of 1 unit per 8 grams CHO consumed (

per 6 grams CHO consumed at breakfast)








Pharmacy will continue to monitor patient daily and write orders per [facility] 

inpatient glycemic control protocol. Thanks.








* Please note that the plan above was derived based on current level of insulin 

resistance and hospital stress. These recommendations are appropriate for 

inpatient admission only. Plan of care upon discharge will need to be 

reassessed to avoid potential outpatient hypo/hyperglycemia.

## 2017-02-16 NOTE — PROGRESS NOTE
Subjective


Date of Service:


Feb 16, 2017.


Subjective


Pt evaluation today including:  conversation w/ patient, conversation w/ family


Pt reports feeling fine.  Pt denies fever, SOB, chest pain, abd pain, n/v/c/d, 

LE pain or swelling.  Eating well.  





ROS as noted above, otherwise neg.





Problem List


Medical Problems:


(1) Atrial fibrillation with RVR


Status: Acute  





(2) CHF (congestive heart failure)


Status: Acute  





(3) New onset atrial fibrillation


Status: Acute  











Objective


Vital Signs











  Date Time  Temp Pulse Resp B/P Pulse Ox O2 Delivery O2 Flow Rate FiO2


 


2/16/17 15:20 37.0 98 16 109/73 93 Room Air  


 


2/16/17 12:00      Room Air  


 


2/16/17 11:19 36.5 83 20 117/70 93 Room Air  


 


2/16/17 08:00      Room Air  


 


2/16/17 07:26 36.2 106 20 106/76 94 Room Air  


 


2/16/17 04:00      Room Air  


 


2/16/17 04:00 36.7 94 18 124/83 96 Room Air  


 


2/16/17 00:03 36.3 98 18 113/68 93 Room Air  


 


2/16/17 00:01      Room Air  


 


2/15/17 20:00      Room Air  


 


2/15/17 19:58 36.6 86 18 101/88 99   











Physical Exam


General Appearance:  WD/WN, no apparent distress


Respiratory/Chest:  normal breath sounds, no respiratory distress


Cardiovascular:  no edema, + pertinent finding (regular rate, irregular rhythm)


Abdomen:  non tender, soft


Extremities:  non-tender, no pedal edema


Neurologic/Psychiatric:  alert, normal mood/affect, oriented x 3


Skin:  normal color, warm/dry





Laboratory Results





Last 24 Hours








Test


  2/15/17


16:42 2/15/17


20:26 2/16/17


06:02 2/16/17


06:50


 


Bedside Glucose 87 mg/dl  223 mg/dl   135 mg/dl 


 


Creatinine   1.10 mg/dl  


 


Est Creatinine Clear Calc


Drug Dose 


  


  48.6 ml/min 


  


 


 


Estimated GFR (


American) 


  


  54.5 


  


 


 


Estimated GFR (Non-


American 


  


  47.0 


  


 


 


Magnesium Level   2.1 mg/dl  














Test


  2/16/17


11:15 2/16/17


12:55 2/16/17


16:20 


 


 


Bedside Glucose 198 mg/dl   140 mg/dl  


 


Sodium Level  141 mmol/L   


 


Potassium Level  3.5 mmol/L   


 


Chloride Level  102 mmol/L   


 


Carbon Dioxide Level  30 mmol/L   


 


Anion Gap  9.0 mmol/L   


 


Blood Urea Nitrogen  26 mg/dl   


 


Creatinine  1.10 mg/dl   


 


Est Creatinine Clear Calc


Drug Dose 


  48.6 ml/min 


  


  


 


 


Estimated GFR (


American) 


  54.5 


  


  


 


 


Estimated GFR (Non-


American 


  47.0 


  


  


 


 


BUN/Creatinine Ratio  24.0   


 


Random Glucose  138 mg/dl   


 


Calcium Level  8.7 mg/dl   











Assessment and Plan


82 y/o F who was a direct admission from cardiology office on 2/13 for afib 

with RVR





1. Afib with RVR


- s/p successful cardioversion in Jan 2017, now back in Afib


- rate better controlled


- amio to PO


- cont Xarelto


- scheduled for cardioversion 2/17





2. Acute on chronic systolic CHF


- 2/2 uncontrolled afib


- cxr reviewed


- cont daily lasix 40mg





3. HTN


- BP acceptable and stable


- will cont toprol from outpatient regimen








4. T2DM


- will hold metformin  daily


- glycemic control consult appreciated





5. Hyperlipidemia


- cont atorvastatin 10mg nightly





Xarelto for DVT proph

## 2017-02-16 NOTE — CARDIOLOGY PROGRESS NOTE
DATE: 02/16/2017

 

TIME:  10:07 a.m.

 

SUBJECTIVE:  She states that her breathing is improving.  She has less

dyspnea with exertion but not yet back to baseline.  She denies chest pain,

palpitation, syncope, near syncope or bleeding.

 

OBJECTIVE:

VITAL SIGNS:  Temperature 36.2 degrees, heart rate 106 beats per minute,

respiration rate 20, blood pressure 106/76 mmHg, oxygen saturation 94% on

room air.  I's and O's, negative 727 mL yesterday.  Weight is 96.2 kg.

GENERAL:  No acute distress.

NECK:  No appreciable JVD.

CARDIAC:  No ventricular heave.  Irregularly irregular.  No audible murmurs,

rubs or gallops.

LUNGS:  Bibasilar rales.

ABDOMEN:  Soft, nontender, nondistended.  Normoactive bowel sounds.  No

bruits noted.

EXTREMITIES:  1+ bilateral lower extremity edema.  No cyanosis.

PSYCHIATRIC:  Affect appears appropriate.

 

MEDICATIONS:  Include amiodarone 400 mg p.o. b.i.d., Lipitor 10 mg daily,

Lasix 40 mg p.o. q.a.m., magnesium oxide 400 mg p.o. b.i.d., metoprolol

succinate 100 mg p.o. b.i.d., Xarelto 20 mg daily, Entresto 24/26 mg 1 tab

b.i.d.

 

Telemetry personally reviewed.  Overall, heart rate appears to be trending

downward.  Remains in atrial fibrillation.

 

LABORATORY DATA:  Creatinine 1.1, magnesium 2.1.

 

ECG from yesterday personally reviewed demonstrating atrial fibrillation at

104 beats per minute.  Septal infarct.

 

ASSESSMENT AND PLAN:

1.  Atrial fibrillation with rapid ventricular response:  Continue amiodarone

loading.  Cardioversion tomorrow.  Continue anticoagulation for stroke risk

reduction.

2.  Acute on chronic systolic congestive heart failure:  Symptomatically, she

improves each day.  We will give Lasix 40 mg IV x1 today.

3.  Cardiomyopathy:  Continue beta-blocker and Entresto.  Etiology may be

secondary to tachycardia.  If no improvement once tachycardia has resolved,

then would consider ischemic evaluation.

4.  Hypertension:  Blood pressure adequately controlled on current regimen.

5.  Hypokalemia and hypomagnesemia:  Supplementation was given yesterday.  We

will repeat basic metabolic panel later today.  Magnesium level has

normalized.

6.  Disposition:  Cardiology will continue to follow.

## 2017-02-17 VITALS
OXYGEN SATURATION: 97 % | HEART RATE: 63 BPM | SYSTOLIC BLOOD PRESSURE: 113 MMHG | TEMPERATURE: 97.88 F | DIASTOLIC BLOOD PRESSURE: 54 MMHG

## 2017-02-17 VITALS — DIASTOLIC BLOOD PRESSURE: 43 MMHG | OXYGEN SATURATION: 98 % | SYSTOLIC BLOOD PRESSURE: 75 MMHG | HEART RATE: 43 BPM

## 2017-02-17 VITALS
HEART RATE: 63 BPM | TEMPERATURE: 97.88 F | OXYGEN SATURATION: 97 % | SYSTOLIC BLOOD PRESSURE: 113 MMHG | DIASTOLIC BLOOD PRESSURE: 54 MMHG

## 2017-02-17 VITALS — SYSTOLIC BLOOD PRESSURE: 82 MMHG | DIASTOLIC BLOOD PRESSURE: 41 MMHG | OXYGEN SATURATION: 98 % | HEART RATE: 44 BPM

## 2017-02-17 VITALS — DIASTOLIC BLOOD PRESSURE: 90 MMHG | OXYGEN SATURATION: 99 % | SYSTOLIC BLOOD PRESSURE: 133 MMHG | HEART RATE: 101 BPM

## 2017-02-17 VITALS — OXYGEN SATURATION: 99 % | HEART RATE: 42 BPM | DIASTOLIC BLOOD PRESSURE: 49 MMHG | SYSTOLIC BLOOD PRESSURE: 99 MMHG

## 2017-02-17 VITALS — DIASTOLIC BLOOD PRESSURE: 51 MMHG | HEART RATE: 44 BPM | OXYGEN SATURATION: 99 % | SYSTOLIC BLOOD PRESSURE: 107 MMHG

## 2017-02-17 VITALS
OXYGEN SATURATION: 94 % | TEMPERATURE: 98.06 F | SYSTOLIC BLOOD PRESSURE: 136 MMHG | DIASTOLIC BLOOD PRESSURE: 81 MMHG | HEART RATE: 103 BPM

## 2017-02-17 VITALS — HEART RATE: 44 BPM | DIASTOLIC BLOOD PRESSURE: 56 MMHG | SYSTOLIC BLOOD PRESSURE: 100 MMHG | OXYGEN SATURATION: 98 %

## 2017-02-17 LAB
ANION GAP SERPL CALC-SCNC: 8 MMOL/L (ref 3–11)
BUN SERPL-MCNC: 27 MG/DL (ref 7–18)
BUN/CREAT SERPL: 22.3 (ref 10–20)
CALCIUM SERPL-MCNC: 8.4 MG/DL (ref 8.5–10.1)
CHLORIDE SERPL-SCNC: 104 MMOL/L (ref 98–107)
CO2 SERPL-SCNC: 31 MMOL/L (ref 21–32)
CREAT CL PREDICTED SERPL C-G-VRATE: 44.7 ML/MIN
CREAT SERPL-MCNC: 1.2 MG/DL (ref 0.6–1.2)
GLUCOSE SERPL-MCNC: 122 MG/DL (ref 70–99)
POTASSIUM SERPL-SCNC: 3.7 MMOL/L (ref 3.5–5.1)
SODIUM SERPL-SCNC: 143 MMOL/L (ref 136–145)

## 2017-02-17 PROCEDURE — 5A2204Z RESTORATION OF CARDIAC RHYTHM, SINGLE: ICD-10-PCS | Performed by: INTERNAL MEDICINE

## 2017-02-17 RX ADMIN — AMIODARONE HYDROCHLORIDE SCH MG: 200 TABLET ORAL at 09:39

## 2017-02-17 RX ADMIN — FUROSEMIDE SCH MG: 40 TABLET ORAL at 09:40

## 2017-02-17 RX ADMIN — RIVAROXABAN SCH MG: 20 TABLET, FILM COATED ORAL at 09:40

## 2017-02-17 RX ADMIN — SACUBITRIL AND VALSARTAN SCH TAB: 24; 26 TABLET, FILM COATED ORAL at 09:39

## 2017-02-17 RX ADMIN — Medication SCH MG: at 09:40

## 2017-02-17 RX ADMIN — INSULIN ASPART SCH UNITS: 100 INJECTION, SOLUTION INTRAVENOUS; SUBCUTANEOUS at 07:30

## 2017-02-17 RX ADMIN — METOPROLOL SUCCINATE SCH MG: 50 TABLET, EXTENDED RELEASE ORAL at 09:00

## 2017-02-17 NOTE — DISCHARGE INSTRUCTIONS
Discharge Instructions


Admission


Reason for Admission:  Atrial Fibrillation With Rvr





Discharge


Discharge Diagnosis / Problem:  Atrial fibrillation with RVR





Discharge Goals


Goal(s):  Decrease discomfort, Improve function, Increase independence





Activity Recommendations


Activity Limitations:  resume your previous activity





.





Instructions / Follow-Up


Instructions / Follow-Up


Dr. Farr next week


Dr. Polanco in 2-3 weeks





Current Hospital Diet


Patient's current hospital diet: Low Sodium Diet (2gm Na), Diabetes Type 2 Diet

, AHA Diet (Heart Healthy)





Discharge Diet


Recommended Diet:  AHA Diet (Heart Healthy), Diabetes Type 2 Diet





Pending Studies


Studies pending at discharge:  no





Laboratory Results





 Hemoglobin A1c








Test


  12/15/16


15:05 Range/Units


 


 


Estimated Average Glucose 203   mg/dl


 


Hemoglobin A1c 8.7 H 4.5-5.6  %








 Lipid Panel








Test


  12/17/16


05:05 Range/Units


 


 


Triglycerides Level 90  0-150  mg/dl


 


Cholesterol Level 124  0-200  mg/dl


 


HDL Cholesterol 52   mg/dl


 


Cholesterol/HDL Ratio 2.4   


 


LDL Cholesterol, Calculated 54   mg/dl











Medical Emergencies








.


Who to Call and When:





Medical Emergencies:  If at any time you feel your situation is an emergency, 

please call 911 immediately.





.





Non-Emergent Contact


Non-Emergency issues call your:  Primary Care Provider





.


.





"Provider Documentation" section prepared by Mirtha Dow.





VTE Core Measure


Inpt VTE Proph given/why not?:  Other Anticoagulation

## 2017-02-17 NOTE — CARDIOLOGY PROCEDURE BRIEF NT
Preliminary Cardiology Note


Procedure Date


Feb 17, 2017.





Pre-Procedure Diagnosis


AFIB with RVR





Post-Procedure Diagnosis


afib converted to sinus





Procedure(s) Performed





DC CV





Cardiologist


Chika





Assistant(s)


none





Estimated Blood Loss


none





Preliminary Findings


Synchronized 200 J unsuccessful.


Synchronized 360 J successfully converted to sinus rhythm.


Bradycardic in sinus HR 40s. Also became hypotensive, but BP supported with 

meds. She remained asymptomatic.





Recommendations


Continue amiodarone. Reduce betablocker.





Specimens





none





Complication(s)


None

## 2017-02-17 NOTE — DISCHARGE SUMMARY
Discharge Summary


Admission Date:


Feb 13, 2017 at 12:17


Discharge Date:  Feb 17, 2017


Discharge Disposition:  Home


Principal Diagnosis:  Afib with RVR


Problems/Secondary Diagnoses:


Afib s/p cardioversion 1/2017


DM


HTN


Hyperlipidemia


systolic CHF with EF 35-40%


Procedures:


Cardioversion 2/17


Consultations:


Dr. Farr


Medication Reconciliation


New Medications:  


Amiodarone HCl (Amiodarone HCl) 200 Mg Tab


200 MG PO BID, #60 TAB





Amiodarone HCl (Amiodarone HCl) 200 Mg Tab


400 MG PO BID for 3 Days, #12 TAB





Metoprolol Succinate (Metoprolol Succinate ER) 50 Mg Tabcr


50 MG PO QAM for 30 Days, #30 TAB





Sacubitril-Valsartan (Entresto 24-26 mg) 1 Tab Tab


1 TAB PO BID for 30 Days, #60 TAB





 


Continued Medications:  


Atorvastatin (Lipitor) 10 Mg Tab


10 MG PO QPM, TAB





Fluticasone Propionate (Flovent Hfa) 120 Puffs/5280 Mcg Aero


2 PUFFS INH BID PRN for Shortness of Breath for 30 Days, #10.6 GM 2 Refills





Furosemide (Lasix) 20 Mg Tab


40 MG PO QAM, TAB





Magnesium Oxide (Mag-Ox) 400 Mg Tab


400 MG PO BID, TAB





Metformin Hcl (Glucophage) 850 Mg Tab


850 MG PO QAM, TAB





Rivaroxaban (Xarelto) 20 Mg Tab


20 MG PO QAM, TAB





 


Discontinued Medications:  


Metoprolol Succinate (Toprol Xl) 100 Mg Tabcr


1 TAB PO BID for 30 Days, #60 TAB 5 Refills











Discharge Exam


Pt is doing well s/p cardioversion.  She did mingo down to the 40s, but has 

been asx with this.  Pt denies fever, SOB, chest pain, abd pain, n/v/c/d, LE 

pain or swelling. Eating well.





ROS as noted above, otherwise neg.


Physical Exam:  


   General Appearance:  WD/WN, no apparent distress


   Respiratory/Chest:  normal breath sounds, no respiratory distress


   Cardiovascular:  regular rate, rhythm, no edema


   Abdomen / GI:  non tender, soft


   Extremities:  no calf tenderness, no pedal edema


   Neurologic/Psychiatric:  alert, normal mood/affect, oriented x 3


   Skin:  normal color, warm/dry





Hospital Course


80 y/o F who was a direct admission from cardiology office on 2/13 for afib 

with RVR





1. Afib with RVR


- s/p successful cardioversion in Jan 2017, now back in Afib


- successful cardioversion this morning, however pt with asx bradycardia s/p 

cardioversion


-likely resultant of prior metoprolol dosing


-Monitoring until early afternoon and with HR increasing to the high 50s low 

60s. Cardiology feels she will continue to improve and decreased dosing of 

metoprolol.  





2. Acute on chronic systolic CHF


- 2/2 uncontrolled afib


- cxr reviewed


- cont daily lasix 40mg





3. HTN


As above








4. T2DM


Resume home meds





5. Hyperlipidemia


- cont atorvastatin 10mg nightly


Total Time Spent:  Greater than 30 minutes


This includes examination of the patient, discharge planning, medication 

reconciliation, and communication with other providers.





Discharge Instructions


Please refer to the electronic Patient Visit Report (Discharge Instructions) 

for additional information.





Follow-Up


Dr. Farr next week


Dr. Polanco in 2-3 weeks





Additional Copies To


Collin Polanco D.O.

## 2017-02-17 NOTE — CARDIOLOGY PROGRESS NOTE
DATE: 02/17/2017

 

DATE:  02/17/2017.  

 

TIME:  8:26 a.m.

 

SUBJECTIVE:  Her breathing has improved on a daily basis.  She denies

shortness of breath at rest or orthopnea, but does have dyspnea with

exertion.  She denies palpitations or chest pain.  She underwent

cardioversion earlier this morning.  She tolerated the procedure well but was

found to be in sinus bradycardia following the procedure with a  heart rate

in the 40s.  She was transiently hypotensive, likely from the sedation and

was given some medication from anesthesiology to improve this.  Her blood

pressure continued to improve with systolic blood pressures in the 100s near

the end of the monitoring time.  She was asymptomatic.

 

OBJECTIVE:

VITAL SIGNS:  Temperature 36.7?, heart rate 44 beats per minute, respiration

rate 16, blood pressure 112/65 mmHg, oxygen saturation 96% on room air.  I's

and O's yesterday, positive 400, weight 96.5 kg.

GENERAL:  No acute distress.  She is alert.

NECK:  No JVD.

CARDIAC EXAMINATION:  Regular but bradycardic.  Normal S1, S2.  No audible

murmurs, rubs or gallops.

LUNGS:  Clear to auscultation bilaterally without wheezes, rales or rhonchi.

ABDOMEN:  Soft, nontender, nondistended.  Normoactive bowel sounds.

EXTREMITIES:  Trace to 1+ bilateral lower extremity edema.  No cyanosis.

PSYCHIATRIC:  Affect appears appropriate.

 

MEDICATIONS:  Include atorvastatin 10 mg daily, amiodarone 400 mg p.o.

b.i.d., Lasix 40 mg p.o. q.a.m.  She also received 40 mg of IV Lasix

yesterday morning, magnesium oxide 400 mg p.o. b.i.d., metoprolol succinate

100 mg p.o. b.i.d., Xarelto 20 mg daily, Entresto 24/26 one tab b.i.d.

 

LABORATORY DATA:  Sodium 143, potassium 3.7, BUN 27, creatinine 1.2, calcium

8.4.

 

Cardioversion performed earlier this morning as noted above.

 

ASSESSMENT AND PLAN:

1. Atrial fibrillation with rapid ventricular response:  She is now in sinus

rhythm following cardioversion.  Continue amiodarone 400 mg twice daily for 3

additional days upon discharge and then 200 mg twice daily.  Reduce

metoprolol secondary to bradycardia.  Continue anticoagulation for stroke

risk reduction.

2. Acute on chronic systolic congestive heart failure:  Likely secondary to

her cardiomyopathy and AFib with RVR.  Continue Lasix at current dose. 

Hopefully, now that she is in sinus her heart failure will be easier to

manage.  Continue Entresto.

3. Cardiomyopathy:  Continue lower dose of beta blocker if heart rate allows.

 Continue Entresto.  Will not further titrate any of her medications at this

time given her blood pressure issues this morning and bradycardia.  If no

improvement with systolic function over time, would consider ischemic

evaluation.

4. Hypertension:  She was actually hypotensive following sedation.  Continue

current regimen with adjustment of beta blocker.

5. Bradycardia:  Now that she is in sinus she is bradycardic.  Metoprolol

succinate will be reduced from a total of 200 mg daily to 50 mg daily if her

heart rate allows.  If ongoing issues with bradycardia, especially if

symptomatic, would consider pacemaker.  Pacemaker not indicated at this time;

however as beta blocker therapy can be adjusted.

6. Disposition:  Could potentially go home later today as long as her heart

rate and blood pressure improve over time.  Plan of care has been discussed

in detail with Dr. Dow of the primary hospitalist service.  Cardiology

will continue to follow along if hospitalized.  Close followup recommended in

the outpatient office.  We will make an appointment for her to be seen next

week in heart failure clinic.

## 2017-02-17 NOTE — ANESTHESIOLOGY PROGRESS NOTE
Anesthesia Post Op Note


Date & Time


Feb 17, 2017 at 08:25





Vital Signs


Pain Intensity:  0





 Vital Signs Past 12 Hours








  Date Time  Temp Pulse Resp B/P Pulse Ox O2 Delivery O2 Flow Rate FiO2


 


2/17/17 08:20  46 16 110/63 96 Room Air  


 


2/17/17 08:10  44 16 112/65 96 Room Air  


 


2/17/17 08:00  46 18 100/65 95 Nasal Cannula 4 





    Manual    


 


2/17/17 07:58  44 18 100/56 98 Nasal Cannula 4 


 


2/17/17 07:55  44 18 82/41 98 Nasal Cannula 4 


 


2/17/17 07:50  43 18 75/43 98 Nasal Cannula 4 


 


2/17/17 07:45  42 18 99/49 99 Nasal Cannula 4 


 


2/17/17 07:40  44 18 107/51 99 Nasal Cannula 4 


 


2/17/17 07:38  101 18 133/90 99 Nasal Cannula 4 


 


2/17/17 04:00      Room Air  


 


2/17/17 04:00 36.7 103 18 136/81 94 Room Air  


 


2/16/17 23:59      Room Air  


 


2/16/17 23:33 36.7 110 20 113/65 93 Room Air  











Notes


Mental Status:  alert / awake / arousable, participated in evaluation


Pt Amnestic to Procedure:  Yes


Nausea / Vomiting:  adequately controlled


Pain:  adequately controlled


Airway Patency, RR, SpO2:  stable & adequate


BP & HR:  stable & adequate, see Notes


Hydration State:  stable & adequate


Anesthetic Complications:  no major complications apparent


The patient was given a dose of ephedrine with Dr. Farr present.  She is 

awake and comfortable.  She will be monitored for her bradycardia.

## 2017-02-17 NOTE — PHARMACY PROGRESS NOTE
Glycemic Control:  Progress Nt


Date of Service


Feb 17, 2017.





Scope


Glycemic Pharmacist consulted for glycemic control and to write orders per [facility] 

inpatient glycemic control protocol.





Objective


Accuchecks BSG (last 24hrs):











Test


  2/16/17


11:15 2/16/17


12:55 2/16/17


16:20 2/16/17


20:31


 


Bedside Glucose


  198 mg/dl


(70-90) 


  140 mg/dl


(70-90) 82 mg/dl


(70-90)


 


Random Glucose


  


  138 mg/dl


(70-99) 


  


 














Test


  2/17/17


05:09 2/17/17


06:26 


  


 


 


Random Glucose


  122 mg/dl


(70-99) 


  


  


 


 


Bedside Glucose


  


  129 mg/dl


(70-90) 


  


 








Laboratory Data (last 24hrs)











Test


  2/16/17


12:55 2/17/17


05:09


 


Anion Gap 9.0 mmol/L  8.0 mmol/L 


 


BUN/Creatinine Ratio 24.0  22.3 


 


Blood Urea Nitrogen 26 mg/dl  27 mg/dl 


 


Creatinine 1.10 mg/dl  1.20 mg/dl 


 


Potassium Level 3.5 mmol/L  3.7 mmol/L 


 


Sodium Level 141 mmol/L  143 mmol/L 








HbA1c:


8.7% on 12/15/16





Recent Pertinent Medications


Outpatient Anti-diabetic Regimen:


* Metformin 850 mg po qAM








The patient is currently receiving:


* Basal insulin:             Lantus every 12 hours based on BSG


            0 units for BSG < 120 mg/dL


            10 units for -179 mg/dL (8 units 2/17 AM only)


            15 units for  mg/dL or greater (12 units 2/17 AM only)


   





* Correctional Insulin:   Novolog Correction per scale ACHS


                         Goal Range: Low 140 mg/dL - High 180 mg/dL


                         Correction Factor: 25 mg/dL/unit (20 mg/dL/unit at 

breakfast)





* Prandial insulin:         Per carb ratio of 1 unit per 8 grams CHO consumed (

per 6 grams CHO consumed at breakfast)








* Oral Agents:             On hold








Risk Factors for Insulin Resistance:


* Diet





Assessment & Plan


ASSESSMENT:


* ADA & AACE recommend a goal blood sugar range 140-180 mg/dl for the majority 

of critically ill & non-critically ill patients.  However, more stringent 

targets may be selected in individual cases.





2/16/17


* Patient scheduled for NPO after 0000 tonight for anticipated cardioversion 

tomorrow.  Prolonged NPO unlikely.


 * Will therefore decrease Lantus very slightly (80%) x1 tomorrow AM.  Will 

then resume at previous.


* BSG to 278 mg/dL at lunch yesterday - addressed by tightening breakfast 

Novolog only


* BSG to 223 mg/dL at HS yesterday - no change as this was 2nd pt's refusal of 

insulin at dinner





2/17/17


* BSG's trended down to 82 mg/dL yesterday at HS


 * Will loosen carb ratio


 * Will also decrease Lantus


* Patient cardioverted this AM and eating "breakfast" late (~1000)


 * No sliding scale appropriately admin this AM


 * Will give one-time order to cover carbs for late breakfast


 * Will eliminate correction factor for 1100 lunch check as this will be 

falsely elevated





PLAN FOR INPATIENT GLYCEMIC CONTROL:





* Continue to hold outpatient oral diabetes medications





* Decrease basal insulin with LANTUS SQ BID


      4 units for BSG < 120 mg/dL


      8 units for -179 mg/dL


      12 units for  mg/dL or greater





* Correctional Insulin with NOVOLOG per scale ACHS or Q6hrs while NPO


 * Goal Range:  Low 140 mg/dL - High 180 mg/dL


 * Correction Factor: 25 mg/dL/unit


 * Nutritional / Prandial insulin per carb ratio of 1 unit per 6 grams CHO 

consumed at breakfast, loosen to 1 unit per 9 grams CHO consumed at other times








* Please note that the plan above was derived based on current level of insulin 

resistance and hospital stress. These recommendations are appropriate for 

inpatient admission only. Plan of care upon discharge will need to be 

reassessed to avoid potential outpatient hypo/hyperglycemia. 








Thank you.

## 2017-06-21 ENCOUNTER — HOSPITAL ENCOUNTER (OUTPATIENT)
Dept: HOSPITAL 45 - C.RAD1850 | Age: 82
Discharge: HOME | End: 2017-06-21
Attending: INTERNAL MEDICINE
Payer: COMMERCIAL

## 2017-06-21 DIAGNOSIS — I50.22: ICD-10-CM

## 2017-06-21 DIAGNOSIS — Z79.899: ICD-10-CM

## 2017-06-21 DIAGNOSIS — R06.09: ICD-10-CM

## 2017-06-21 DIAGNOSIS — I42.9: ICD-10-CM

## 2017-06-21 DIAGNOSIS — I10: Primary | ICD-10-CM

## 2017-06-21 DIAGNOSIS — I48.91: ICD-10-CM

## 2017-06-21 LAB
ANION GAP SERPL CALC-SCNC: 10 MMOL/L (ref 3–11)
B-OH-BUTYR SERPL-SCNC: 1.2 MG/DL (ref 0.2–2.81)
BUN SERPL-MCNC: 22 MG/DL (ref 7–18)
BUN/CREAT SERPL: 14.7 (ref 10–20)
CALCIUM SERPL-MCNC: 9.2 MG/DL (ref 8.5–10.1)
CHLORIDE SERPL-SCNC: 98 MMOL/L (ref 98–107)
CO2 SERPL-SCNC: 28 MMOL/L (ref 21–32)
CREAT SERPL-MCNC: 1.5 MG/DL (ref 0.6–1.2)
EOSINOPHIL NFR BLD AUTO: 310 K/UL (ref 130–400)
GLUCOSE SERPL-MCNC: 411 MG/DL (ref 70–99)
HCT VFR BLD CALC: 30.7 % (ref 37–47)
MCH RBC QN AUTO: 24.1 PG (ref 25–34)
MCHC RBC AUTO-ENTMCNC: 30.6 G/DL (ref 32–36)
MCV RBC AUTO: 78.7 FL (ref 80–100)
NRBC BLD AUTO-RTO: 1.2 %
PMV BLD AUTO: 10.7 FL (ref 7.4–10.4)
POTASSIUM SERPL-SCNC: 4.2 MMOL/L (ref 3.5–5.1)
RBC # BLD AUTO: 3.9 M/UL (ref 4.2–5.4)
SODIUM SERPL-SCNC: 136 MMOL/L (ref 136–145)
TSH SERPL-ACNC: 13 UIU/ML (ref 0.3–4.5)
WBC # BLD AUTO: 6.55 K/UL (ref 4.8–10.8)

## 2017-06-21 NOTE — DIAGNOSTIC IMAGING REPORT
TWO VIEW CHEST



CLINICAL HISTORY: Cardiomyopathy. Atrial fibrillation..



FINDINGS: PA and lateral chest radiographs are compared to study dated 5/3/2017.

The heart is enlarged. The pulmonary vasculature is noncongested. Chronic

interstitial thickening is similar to previous. Findings suggest emphysema. No

airspace consolidation or pleural effusion is identified. There is minimal left

basilar atelectasis. No pneumothorax is seen. The skeletal structures are

osteopenic. The bony thorax appears intact.



IMPRESSION: Cardiomegaly and suspect emphysema. No acute cardiopulmonary

abnormality is identified. 







Electronically signed by:  Godwin Mckoy M.D.

6/21/2017 1:09 PM



Dictated Date/Time:  6/21/2017 1:08 PM

## 2017-06-29 ENCOUNTER — HOSPITAL ENCOUNTER (OUTPATIENT)
Dept: HOSPITAL 45 - C.NUCL | Age: 82
Discharge: HOME | End: 2017-06-29
Attending: INTERNAL MEDICINE
Payer: COMMERCIAL

## 2017-06-29 DIAGNOSIS — R06.09: ICD-10-CM

## 2017-06-29 DIAGNOSIS — I42.9: ICD-10-CM

## 2017-06-29 DIAGNOSIS — I10: ICD-10-CM

## 2017-06-29 DIAGNOSIS — Z79.899: ICD-10-CM

## 2017-06-29 DIAGNOSIS — I48.91: Primary | ICD-10-CM

## 2017-06-29 DIAGNOSIS — I50.22: ICD-10-CM

## 2017-06-29 LAB
ANION GAP SERPL CALC-SCNC: 8 MMOL/L (ref 3–11)
B-OH-BUTYR SERPL-SCNC: 1.03 MG/DL (ref 0.2–2.81)
BUN SERPL-MCNC: 21 MG/DL (ref 7–18)
BUN/CREAT SERPL: 14.8 (ref 10–20)
CALCIUM SERPL-MCNC: 9.5 MG/DL (ref 8.5–10.1)
CHLORIDE SERPL-SCNC: 99 MMOL/L (ref 98–107)
CO2 SERPL-SCNC: 27 MMOL/L (ref 21–32)
CREAT SERPL-MCNC: 1.4 MG/DL (ref 0.6–1.2)
EOSINOPHIL NFR BLD AUTO: 320 K/UL (ref 130–400)
GLUCOSE SERPL-MCNC: 331 MG/DL (ref 70–99)
HCT VFR BLD CALC: 30.1 % (ref 37–47)
MCH RBC QN AUTO: 22.8 PG (ref 25–34)
MCHC RBC AUTO-ENTMCNC: 29.2 G/DL (ref 32–36)
MCV RBC AUTO: 78 FL (ref 80–100)
PMV BLD AUTO: 10.1 FL (ref 7.4–10.4)
POTASSIUM SERPL-SCNC: 4.2 MMOL/L (ref 3.5–5.1)
RBC # BLD AUTO: 3.86 M/UL (ref 4.2–5.4)
SODIUM SERPL-SCNC: 134 MMOL/L (ref 136–145)
WBC # BLD AUTO: 8.41 K/UL (ref 4.8–10.8)

## 2017-06-29 NOTE — MYOCARDIAL PERFUSION STUDY
Myocardial Perfusion Study Rpt


Myocardial Perfusion Study Rpt


Date of Service


6/29/2017


Myocardial Perfusion Study Rpt


Procedure:


1. Myocardial perfusion study performed in multiple views/images


2. Lexiscan pharmacologic stress ECG





Indications:


1.  Dyspnea with exertion


2. Chronic systolic CHF


3. Atrial fibrillation


4. Cardiomyopathy





Consent:  Informed written consent was obtained prior to the procedure.


Ordering physician: Dr. Farr





Procedural details:  


For the stress portion of the study, Lexiscan 0.4 mg was intravenously 

administered followed by a saline flush.  This was followed by 32.5 mCi of 

technetium 99m Cardiolite, injected at 13 40 p.m. on 06/29/2017.  30 minutes 

following the injection, imaging of the heart was performed in multiple 

projections.  For the rest portion of the study, 10.5 mCi technetium 99m 

Cardiolite was injected intravenously at 11:50 a.m. on 06/29/2017.  1 hour 

following the injection, imaging of the heart was performed in the same 

projections.





Lexiscan stress ECG:





Resting ECG demonstrated:  Sinus bradycardia at 47 bpm.  Anteroseptal infarct.  


Maximum heart rate:  60 bpm


Resting blood pressure:  207/72mmHg


Maximum blood pressure:  219/81mmHg


Maximal, age-predicted heart rate:  43 %


Significant ST changes:  None


Arrhythmia:  None 


Symptoms:  Shortness of breath





Findings:


Rotating raw imaging demonstrated no significant lung uptake.  There is no 

significant motion artifact.  Heart size appeared normal.  Myocardial perfusion 

demonstrated a small area of mildly reduced uptake in the mid to distal 

anterior wall which appeared to be fixed and post stress and rest imaging 

without significant reversibility.  





Ejection fraction:  75 %


Wall motion:  Normal


No significant transient ischemic dilation.





Impression:


1. No ischemic changes suggested.


2. Small mid to distal anterior fixed defect likely represents attenuation 

artifact given normal wall motion.


3. Lexiscan induced dyspnea.


4. No arrhythmia.  


5. Normal wall motion.


6. Hyperdynamic LV systolic function with Lexiscan stress.  EF 75%


7. Nondiagnostic Lexiscan ECG.

## 2017-08-22 ENCOUNTER — HOSPITAL ENCOUNTER (INPATIENT)
Dept: HOSPITAL 45 - C.EDA | Age: 82
LOS: 1 days | Discharge: HOME HEALTH | DRG: 292 | End: 2017-08-23
Attending: HOSPITALIST | Admitting: STUDENT IN AN ORGANIZED HEALTH CARE EDUCATION/TRAINING PROGRAM
Payer: COMMERCIAL

## 2017-08-22 VITALS
WEIGHT: 202.83 LBS | WEIGHT: 202.83 LBS | BODY MASS INDEX: 30.74 KG/M2 | BODY MASS INDEX: 30.74 KG/M2 | HEIGHT: 68 IN | HEIGHT: 68 IN

## 2017-08-22 DIAGNOSIS — Z87.891: ICD-10-CM

## 2017-08-22 DIAGNOSIS — E78.5: ICD-10-CM

## 2017-08-22 DIAGNOSIS — E87.6: ICD-10-CM

## 2017-08-22 DIAGNOSIS — E83.30: ICD-10-CM

## 2017-08-22 DIAGNOSIS — E83.42: ICD-10-CM

## 2017-08-22 DIAGNOSIS — Z79.899: ICD-10-CM

## 2017-08-22 DIAGNOSIS — Z79.51: ICD-10-CM

## 2017-08-22 DIAGNOSIS — Z23: ICD-10-CM

## 2017-08-22 DIAGNOSIS — N17.9: ICD-10-CM

## 2017-08-22 DIAGNOSIS — J44.9: ICD-10-CM

## 2017-08-22 DIAGNOSIS — I11.0: Primary | ICD-10-CM

## 2017-08-22 DIAGNOSIS — E11.9: ICD-10-CM

## 2017-08-22 DIAGNOSIS — I48.91: ICD-10-CM

## 2017-08-22 DIAGNOSIS — E03.9: ICD-10-CM

## 2017-08-22 DIAGNOSIS — E78.00: ICD-10-CM

## 2017-08-22 DIAGNOSIS — Z79.84: ICD-10-CM

## 2017-08-22 DIAGNOSIS — I50.33: ICD-10-CM

## 2017-08-22 LAB
ALBUMIN/GLOB SERPL: 0.8 {RATIO} (ref 0.9–2)
ALP SERPL-CCNC: 77 U/L (ref 45–117)
ALT SERPL-CCNC: 52 U/L (ref 12–78)
ANION GAP SERPL CALC-SCNC: 8 MMOL/L (ref 3–11)
ANISOCYTOSIS BLD QL SMEAR: PRESENT
APPEARANCE UR: CLEAR
AST SERPL-CCNC: 45 U/L (ref 15–37)
BASE EXCESS STD BLDV CALC-SCNC: 5.2 MEQ/L
BASOPHILS # BLD: 0.01 K/UL (ref 0–0.2)
BASOPHILS NFR BLD: 0.1 %
BILIRUB UR-MCNC: (no result) MG/DL
BUN SERPL-MCNC: 14 MG/DL (ref 7–18)
BUN/CREAT SERPL: 10.8 (ref 10–20)
CALCIUM SERPL-MCNC: 9 MG/DL (ref 8.5–10.1)
CHLORIDE SERPL-SCNC: 100 MMOL/L (ref 98–107)
CO2 SERPL-SCNC: 29 MMOL/L (ref 21–32)
COLOR UR: YELLOW
COMPLETE: YES
CREAT CL PREDICTED SERPL C-G-VRATE: 41.5 ML/MIN
CREAT SERPL-MCNC: 1.3 MG/DL (ref 0.6–1.2)
EOSINOPHIL NFR BLD AUTO: 285 K/UL (ref 130–400)
GLOBULIN SER-MCNC: 4.4 GM/DL (ref 2.5–4)
GLUCOSE SERPL-MCNC: 174 MG/DL (ref 70–99)
HCT VFR BLD CALC: 32 % (ref 37–47)
HYPOCHROMIA BLD QL SMEAR: PRESENT
IG%: 0.4 %
IMM GRANULOCYTES NFR BLD AUTO: 4.5 %
INR PPP: 1.2 (ref 0.9–1.1)
LYMPHOCYTES # BLD: 0.57 K/UL (ref 1.2–3.4)
MAGNESIUM SERPL-MCNC: 1.7 MG/DL (ref 1.8–2.4)
MANUAL MICROSCOPIC REQUIRED?: NO
MCH RBC QN AUTO: 22.8 PG (ref 25–34)
MCHC RBC AUTO-ENTMCNC: 30.3 G/DL (ref 32–36)
MCV RBC AUTO: 75.3 FL (ref 80–100)
MONOCYTES NFR BLD: 8.6 %
NEUTROPHILS # BLD AUTO: 0.5 %
NEUTROPHILS NFR BLD AUTO: 85.9 %
NITRITE UR QL STRIP: (no result)
NRBC BLD AUTO-RTO: 0.2 %
PARTIAL THROMBOPLASTIN RATIO: 1
PCO2 BLDV: 41 MMHG (ref 38–50)
PH UR STRIP: 7 [PH] (ref 4.5–7.5)
PHOSPHATE SERPL-MCNC: 1.9 MG/DL (ref 2.5–4.9)
PMV BLD AUTO: 10.2 FL (ref 7.4–10.4)
PO2 BLDV: 33 MMHG
POTASSIUM SERPL-SCNC: 3.3 MMOL/L (ref 3.5–5.1)
PROTHROMBIN TIME: 12.4 SECONDS (ref 9–12)
RBC # BLD AUTO: 4.25 M/UL (ref 4.2–5.4)
REVIEW REQ?: NO
SAO2 % BLDV FROM PO2: 64 %
SODIUM SERPL-SCNC: 137 MMOL/L (ref 136–145)
SP GR UR STRIP: 1.01 (ref 1–1.03)
TOXIC GRANULES BLD QL SMEAR: (no result)
URINE EPITHELIAL CELL AUTO: (no result) /LPF (ref 0–5)
UROBILINOGEN UR-MCNC: (no result) MG/DL
WBC # BLD AUTO: 12.62 K/UL (ref 4.8–10.8)
ZZUR CULT IF INDIC CLEAN CATCH: NO

## 2017-08-22 NOTE — HISTORY AND PHYSICAL
History & Physical


Date & Time of Service:


Aug 22, 2017 at 22:16


Chief Complaint:


Leg Weakness/Nausea/Vomiting


Primary Care Physician:


Sharan Olsen PA-C


History of Present Illness


Source:  patient


The patient presents to the emergency room by EMS for an episode of weakness 

earlier on today.





The patient states that she was at dialysis with her .  She was standing 

outside in the sun for 30 minutes waiting for him to complete dialysis.  She 

does state that so far today she's not had much to eat or drink.  Upon going 

back inside she stated to him that she was feeling unwell, and weak.  She sat 

down to try and collect herself but did not improve over 30 minutes, so her 

 called EMS.





It is noted that when EMS arrived, the patient was slumped over, and was too 

weak to get up on her own.  However she denies difficulty with mentation or 

fogginess.  She denies any syncope, or falls.





The patient denies chest pain, palpitations, orthopnea.  The patient has 

chronic exertional dyspnea, which is not changed recently for her.  She denies 

any coughing or wheezing.


She does state that she has been given Lasix general for lower extremity 

swelling.  She does bruise on 40 mg twice a day, but is been changed 2 weeks 

ago to 80 mg once daily.  She states that she did initially have some worsening 

leg swelling, but since the Lasix regimen change her swelling is actually been 

improving.  She denies any calf pain or calf tenderness.





Otherwise the patient denies any nausea, vomiting, abdominal pain, diarrhea or 

constipation.  She states that she's been eating and drinking at her baseline.  

She is also trying her best to maintain good dietary discretion and keep a low 

salt intake.





The patient states that she is unsure if she has a diagnosis of coronary 

disease or CHF.  Records indicate she had an echocardiogram done in May 2017 

which showed a low-normal ejection fraction 55-60%.  She states that she 

follows with Dr. Farr.





Past Medical/Surgical History


Medical Problems:


(1) Asthma


Status: Chronic  





HTN


Hypercholesterolemia


Atrial Fibrillation, rhythm controlled


History of bradycardia, secondary to medications








Family History





Patient reports no known family medical history.





Social History


Smoking Status:  Former Smoker (quit 40 years ago; 1 pack per week.  Uncertain 

how long)


Smokeless Tobacco Use:  No


Alcohol Use:  none


Drug Use:  none


Marital Status:  


Housing status:  lives with family, other (has nursing visit once per week.)


Occupational Status:  retired





Immunizations


History of Influenza Vaccine:  Unknown


History of Tetanus Vaccine?:  Unknown


History of Pneumococcal:  Unknown


History of Hepatitis B Vaccine:  Unknown





Multi-Drug Resistant Organisms


History of MDRO:  No





Allergies


Coded Allergies:  


     Oxytetracycline (Unverified  Allergy, Unknown, RASH, 8/22/17)


     Polymyxin B (Unverified  Allergy, Unknown, RASH, 8/22/17)





Home Medications


Scheduled


Amiodarone Hcl (Cordarone), 200 MG PO BID


Atorvastatin (Lipitor), 10 MG PO HS


Carvedilol (Coreg), 12.25 MG PO BID


Ferrous Sulfate (Ferrous Sulfate), 325 MG PO DAILY


Fluticasone Furoate-Vilanterol (Breo Ellipta), 1 PUFF PO DAILY


Glipizide (Glucotrol), 10 MG PO BID


Levothyroxine Sodium (Synthroid), 50 MCG PO DAILY


Losartan Potassium (Cozaar), 100 MG PO DAILY


Magnesium Oxide (Magnesium), 500 MG PO BID





Scheduled PRN


Fluticasone Propionate (Flovent Hfa), 2 PUFFS INH BID PRN for Shortness of 

Breath





Review of Systems


A 10 point review of systems was negative unless stated above.





Physical Exam


Vital Signs











  Date Time  Temp Pulse Resp B/P (MAP) Pulse Ox O2 Delivery O2 Flow Rate FiO2


 


8/22/17 21:34  66 22 131/51 94 Room Air  


 


8/22/17 20:38  75 18 181/89 93 Room Air  


 


8/22/17 19:59  70      


 


8/22/17 18:37  86 18 188/81 94 Room Air  


 


8/22/17 16:49     93 Nasal Cannula 3.0 


 


8/22/17 16:49 37.3 83 20 167/116 88 Room Air  


 


8/22/17 16:48  74      








General Appearance:  WD/WN, no apparent distress


Head:  normocephalic, atraumatic


Eyes:  normal inspection, EOMI


ENT:  hearing grossly normal, pharynx normal


Neck:  supple, no adenopathy, no JVD


Respiratory/Chest:  + pertinent finding (diminished breath sounds with crackles

, R > L)


Cardiovascular:  regular rate, rhythm, no gallop, no murmur


Abdomen/GI:  normal bowel sounds, non tender, soft


Back:  no CVA tenderness, no muscle spasm


Extremities/Musculoskelatal:  no calf tenderness, normal range of motion, + 

pedal edema (1+; rendness at ankles), + pertinent finding


Neurologic/Psych:  alert, normal mood/affect


Skin:  normal color, warm/dry, no rash


Lymphatic:  no adenopathy





Diagnostics


Laboratory Results





Results Past 24 Hours








Test


  8/22/17


17:52 8/22/17


17:56 8/22/17


18:01 8/22/17


20:54 Range/Units


 


 


White Blood Count 12.62    4.8-10.8  K/uL


 


Red Blood Count 4.25    4.2-5.4  M/uL


 


Hemoglobin 9.7    12.0-16.0  g/dL


 


Hematocrit 32.0    37-47  %


 


Mean Corpuscular Volume 75.3      fL


 


Mean Corpuscular Hemoglobin 22.8    25-34  pg


 


Mean Corpuscular Hemoglobin


Concent 30.3


  


  


  


  32-36  g/dl


 


 


Platelet Count 285    130-400  K/uL


 


Mean Platelet Volume 10.2    7.4-10.4  fL


 


Neutrophils (%) (Auto) 85.9     %


 


Lymphocytes (%) (Auto) 4.5     %


 


Monocytes (%) (Auto) 8.6     %


 


Eosinophils (%) (Auto) 0.5     %


 


Basophils (%) (Auto) 0.1     %


 


Neutrophils # (Auto) 10.84    1.4-6.5  K/uL


 


Lymphocytes # (Auto) 0.57    1.2-3.4  K/uL


 


Monocytes # (Auto) 1.09    0.11-0.59  K/uL


 


Eosinophils # (Auto) 0.06    0-0.5  K/uL


 


Basophils # (Auto) 0.01    0-0.2  K/uL


 


RDW Standard Deviation 56.3    36.4-46.3  fL


 


RDW Coefficient of Variation 20.9    11.5-14.5  %


 


Immature Granulocyte % (Auto) 0.4     %


 


Immature Granulocyte # (Auto) 0.05    0.00-0.02  K/uL


 


Nucleated RBC Absolute Count


(auto) 0.02


  


  


  


  0-0  K/uL


 


 


Nucleated Red Blood Cells % 0.2     %


 


Toxic Granulation 1+     


 


Hypochromasia PRESENT     


 


Anisocytosis PRESENT     


 


Prothrombin Time


  12.4


  


  


  


  9.0-12.0


SECONDS


 


Prothromb Time International


Ratio 1.2


  


  


  


  0.9-1.1  


 


 


Activated Partial


Thromboplast Time 24.8


  


  


  


  21.0-31.0


SECONDS


 


Partial Thromboplastin Ratio 1.0     


 


Sodium Level 137    136-145  mmol/L


 


Potassium Level 3.3    3.5-5.1  mmol/L


 


Chloride Level 100      mmol/L


 


Carbon Dioxide Level 29    21-32  mmol/L


 


Anion Gap 8.0    3-11  mmol/L


 


Blood Urea Nitrogen 14    7-18  mg/dl


 


Creatinine


  1.30


  


  


  


  0.60-1.20


mg/dl


 


Est Creatinine Clear Calc


Drug Dose 41.5


  


  


  


   ml/min


 


 


Estimated GFR (


American) 44.2


  


  


  


   


 


 


Estimated GFR (Non-


American 38.2


  


  


  


   


 


 


BUN/Creatinine Ratio 10.8    10-20  


 


Random Glucose 174    70-99  mg/dl


 


Calcium Level 9.0    8.5-10.1  mg/dl


 


Phosphorus Level 1.9    2.5-4.9  mg/dl


 


Magnesium Level 1.7    1.8-2.4  mg/dl


 


Total Bilirubin 0.9    0.2-1  mg/dl


 


Aspartate Amino Transf


(AST/SGOT) 45


  


  


  


  15-37  U/L


 


 


Alanine Aminotransferase


(ALT/SGPT) 52


  


  


  


  12-78  U/L


 


 


Alkaline Phosphatase 77      U/L


 


Pro-B-Type Natriuretic Peptide 2137    0-1800  pg/ml


 


Total Protein 7.8    6.4-8.2  gm/dl


 


Albumin 3.4    3.4-5.0  gm/dl


 


Globulin 4.4    2.5-4.0  gm/dl


 


Albumin/Globulin Ratio 0.8    0.9-2  


 


Bedside Troponin I  0.030   0-0.045  ng/ml


 


Urine Color   YELLOW   


 


Urine Appearance   CLEAR  CLEAR  


 


Urine pH   7.0  4.5-7.5  


 


Urine Specific Gravity   1.012  1.000-1.030  


 


Urine Protein   NEG  NEG  


 


Urine Glucose (UA)   NEG  NEG  


 


Urine Ketones   NEG  NEG  


 


Urine Occult Blood   NEG  NEG  


 


Urine Nitrite   NEG  NEG  


 


Urine Bilirubin   NEG  NEG  


 


Urine Urobilinogen   NEG  NEG  


 


Urine Leukocyte Esterase   NEG  NEG  


 


Urine WBC (Auto)   1-5  0-5  /hpf


 


Urine RBC (Auto)   0-4  0-4  /hpf


 


Urine Hyaline Casts (Auto)   0  0-5  /lpf


 


Urine Epithelial Cells (Auto)   5-10  0-5  /lpf


 


Urine Bacteria (Auto)   NEG  NEG  


 


Venous Blood pH    7.47 7.36-7.41  


 


Venous Blood Partial Pressure


CO2 


  


  


  41


  38.0-50.0  mmHg


 


 


Venous Blood Partial Pressure


O2 


  


  


  33


   mmHg


 


 


Venous Blood HCO3    29  mmol/L


 


Venous Blood Oxygen Saturation    64.0  %


 


Venous Blood Base Excess    5.2  mEq/L











Diagnostic Radiology


CHEST ONE VIEW PORTABLE





CLINICAL HISTORY: Weakness. Atrial fibrillation. History of congestive heart


failure.    





COMPARISON STUDY:  Chest radiograph June 21, 2017.





FINDINGS: No pneumothorax is present. A small right pleural effusion has


developed. There is a trace left pleural effusion. Left basilar opacity is


suggestive of atelectasis. Cardiomegaly is unchanged. Pulmonary vascular


congestion is noted with mild pulmonary edema. This has developed since prior


exam. 





IMPRESSION:  Interval development of mild pulmonary edema and a small right


pleural effusion.





EKG


Sinus rhythm with occasional Premature ventricular complexes


Anteroseptal infarct (cited on or before 15-DEC-2016)


ST & T wave abnormality, consider inferior ischemia


Abnormal ECG


When compared with ECG of 03-MAY-2017 17:01,


Significant changes have occurred





Impression


Assessment and Plan


82-year-old female presenting with sudden onset weakness.





She does have some diminished breath sounds at her bases.  She also has a 

mildly elevated BNP.  And the chest x-ray does have findings consistent with 

mild exacerbation of her CHF.  She is chest pain-free and did have an initial 

troponin which was negative





I have reviewed her records particular cardiology records, which states she has 

history of chronic diastolic congestive heart failure.  Most recent EF in our 

records is low-normal from 55-60%.  





For her is as follows:





Mild acute diastolic CHF exacerbation


- Lasix 40 mg given in the ED; will continue with 40 mg IV twice a day


- Daily weights


- Intakes and output


- Low-salt diet


- Losartan on hold at this time due to mild ABIDA


- Continue carvedilol





Acute kidney injury


- Mild; creatinine 1.3, baseline 1.1-1.2


- Cautious diuresis as above


- [facility] monitoring 





Hypokalemia


- K3.3 on arrival; patient was given Lasix in the emergency department


- Order for her to get 40 mg oral Lasix tonight and 20 mg twice a day starting 

tomorrow


- Goal K should be greater than 4.0 in the setting history of A. fib





Hypophosphatemia


- Patient was repleted with sodium phosphate in the ER


- We'll repeat phosphate level with morning labs





Hypomagnesemia


- Patient was given oral repletion in the ED


- Repeat with morning labs with morning labs





History of atrial fibrillation


- Continue amiodarone and carvedilol


- The patient is not on systemic anticoagulation at home per her med list





Type 2 diabetes mellitus


- Metformin and glipizide at the proximal hold


-Insulin sliding scale with before meals and at bedtime Accu-Checks





Asthma


- The patient takes Breo, which we do not have here in the hospital


- We'll replace with Symbicort while she is inpatient





Hyperlipidemia


- Continue atorvastatin





Hypothyroidism


- Continue levothyroxine





DVT prophylaxis


- Heparin 5000 3 times a day


- SCD


- Ok





CODE STATUS


- Level I





Disposition


- Telemetry


- PT and OT orders have been placed








Attending Addendum:





I have physically seen and examined this patient, have supervised the medical 

residents activities, and agree with the H&P as noted above with the following 

exceptions as noted.





The patient presents to the emergency department after an episode of weakness 

that developed after standing out in the hot sun for 30 minutes, and has had 

overall decreased oral intake during the day.  She was reportedly slumped over 

when initially seen by EMS, but she denies any loss of consciousness.  She has 

chronic dyspnea on exertion, but she feels may be worse over the past few weeks.





The patient denies chest pain, palpitations, cough, lower extremity swelling, 

vision change, hearing change, sore throat, fevers, chills, sweats, weight 

change, fatigue, nausea, vomiting, abdominal pain, pelvic pain, blood in urine 

or stool, dysuria, urinary frequency or urgency, lightheadedness, dizziness, 

headache, memory loss, rash, abnormal bruising or bleeding, imbalance, focal or 

generalized weakness, numbness or tingling in arms or legs, generalized 

arthralgias or myalgias, back or neck pain, night sweats, or allergy symptoms.


The review of systems is otherwise negative other than for that already noted 

above, and at least 10 systems have been reviewed.








The patient is awake, well-developed and adequately nourished, alert and 

oriented 3, normocephalic and atraumatic, lying in bed and in no acute 

distress.





HEENT--PERRL, EOMI, mucous membranes  and oropharynx dry.


Neck--supple, no JVD or bruits, thyroid normal, trachea midline, no adenopathy.


Heart--normal S1 and S2, no extra beats, no murmurs, rubs or gallops.


Lungs--crackles at the bases senior care up bilaterally, right worse than left.  No 

respiratory distress, no accessory muscle use.


Abdomen--normal bowel sounds and soft, nontender and nondistended, no hernias 

or masses,  no organomegaly.


Extremities--no cyanosis, clubbing.  There is bilaterally 1+ pitting pedal 

Edema. There are good distal pulses b/l.


Dermatologic--normal skin turgor, normal color, warm and dry, no abnormal lymph 

nodes, no rash.


Neurologic--cranial nerves II through XII grossly intact, motor and sensory 

examination normal.


Rheumatologic--normal range of motion, nontender, muscles and joints for age.


Psychiatric--normal affect.





Assessment and Plan:





1.  Acute on chronic diastolic CHF/atrial fibrillation/hypertension--The 

patient will be admitted to telemetry for serial cardiac enzymes, cardiac 

rhythm monitoring and a 2-D echocardiogram with Dopplers.


Patient is given Lasix 40 mg IV in the ED.


Place on Lasix 40 mg IV twice a day.


Potassium chloride 40 mEq by mouth now, and BID.


Given oral magnesium in the ED, will likely need 40 mg by mouth twice a day.


Hold losartan.


Continue carvedilol 12.25 mg by mouth twice a day, amiodarone 200 mg by mouth 

twice a day.





2.  Diabetes mellitus--hold metformin and glipizide.


Place on Accu-Cheks before meals and at bedtime with NovoLog coverage per scale.





3.  Hyperlipidemia--continue atorvastatin 10 mg by mouth at bedtime.














Level of Care


Telemetry





Advanced Directives


Existing Advance Directive:  No


Existing Living Will:  No


Existing Power of :  No





Resuscitation Status


FULL RESUSCITATION





VTE Prophylaxis


Given or contraindicated:  Unfractionated heparin SQ

## 2017-08-22 NOTE — DIAGNOSTIC IMAGING REPORT
CHEST ONE VIEW PORTABLE



CLINICAL HISTORY: Weakness. Atrial fibrillation. History of congestive heart

failure.    



COMPARISON STUDY:  Chest radiograph June 21, 2017.



FINDINGS: No pneumothorax is present. A small right pleural effusion has

developed. There is a trace left pleural effusion. Left basilar opacity is

suggestive of atelectasis. Cardiomegaly is unchanged. Pulmonary vascular

congestion is noted with mild pulmonary edema. This has developed since prior

exam. 



IMPRESSION:  Interval development of mild pulmonary edema and a small right

pleural effusion. 









Electronically signed by:  Bandar Washington M.D.

8/22/2017 6:09 PM



Dictated Date/Time:  8/22/2017 6:06 PM

## 2017-08-22 NOTE — EMERGENCY ROOM VISIT NOTE
History


Report prepared by Gume:  Sam Andersen


Under the Supervision of:  Dr. Santhosh Kim M.D.


First contact with patient:  17:23


Chief Complaint:  WEAKNESS


Stated Complaint:  LEG WEAKNESS/NAUSEA/VOMITING


Nursing Triage Summary:  


Patient arrived via ALS. Patient returned from PCP this morning without any 


issues and was started on new inhaler and lasix was stopped. Patient had sudden 


onset generalized weakness and was found to be leaning to right side. Patient 


was found by EMS over toilet dry heaving. Patient denies CP, SOB, N/V/D upon 


arrival to ER. Hx of HTN, AFib, COPD.





History of Present Illness


The patient is an 82 year old white female with a past medical history of a-fib

, CHF, DM, and HTN who presents to the ED with a cc of constant, generalized 

weakness beginning this morning. The patient states that she was outside 

waiting for her  to finish dialysis when she became weak. She reports 

that she typically does this when he receives dialysis. The patient notes that 

she thinks it was the heat and that she was hungry. The patient reports that 

she also has an occasional, non-productive cough. She states that she is 

currently taking a water pill, and her chronic Bilat LE edema is decreasing. 

The patient reports that she is on O2 at home as needed. She denies SOB, chest 

pain, nausea, vomiting, abdominal pain, abnormal changes in the past few days, 

fevers, chills, alcohol use, tobacco use, missing doses of medication, changes 

in urination, changes in bowel movements, and increased edema in her legs.





   Source of History:  patient


   Onset:  this morning


   Position:  other (global)


   Quality:  other (weakness)


   Timing:  constant


   Associated Symptoms:  + cough, No fevers, No chills, No chest pain, No SOB, 

No nausea, No vomiting, No abdominal pain, No urinary symptoms


Note:


Denies: changes in bowel movements, increased edema in her legs





Review of Systems


See HPI for pertinent positives and negatives.  A total of ten systems were 

reviewed and were otherwise negative.





Past Medical & Surgical


Medical Problems:


(1) Afib


(2) Asthma


(3) Congestive heart failure


(4) History of high blood pressure


(5) HTN (hypertension)


(6) Hyperlipidemia


(7) Systolic CHF, acute


(8) T2DM (type 2 diabetes mellitus)








Family History





Patient reports no known family medical history.





Social History


Smoking Status:  Former Smoker


Smokeless Tobacco Use:  No


Alcohol Use:  none


Drug Use:  none


Marital Status:  


Occupation Status:  retired





Current/Historical Medications


Scheduled


Amiodarone Hcl (Cordarone), 200 MG PO BID


Atorvastatin (Lipitor), 10 MG PO HS


Carvedilol (Coreg), 12.25 MG PO BID


Ferrous Sulfate (Ferrous Sulfate), 325 MG PO DAILY


Fluticasone Furoate-Vilanterol (Breo Ellipta), 1 PUFF PO DAILY


Glipizide (Glucotrol), 10 MG PO BID


Levothyroxine Sodium (Synthroid), 50 MCG PO DAILY


Losartan Potassium (Cozaar), 100 MG PO DAILY


Magnesium Oxide (Magnesium), 500 MG PO BID





Scheduled PRN


Fluticasone Propionate (Flovent Hfa), 2 PUFFS INH BID PRN for Shortness of 

Breath





Allergies


Coded Allergies:  


     Oxytetracycline (Unverified  Allergy, Unknown, RASH, 8/22/17)


     Polymyxin B (Unverified  Allergy, Unknown, RASH, 8/22/17)





Physical Exam


Vital Signs











  Date Time  Temp Pulse Resp B/P (MAP) Pulse Ox O2 Delivery O2 Flow Rate FiO2


 


8/22/17 21:34  66 22 131/51 94 Room Air  


 


8/22/17 20:38  75 18 181/89 93 Room Air  


 


8/22/17 19:59  70      


 


8/22/17 18:37  86 18 188/81 94 Room Air  


 


8/22/17 16:49     93 Nasal Cannula 3.0 


 


8/22/17 16:49 37.3 83 20 167/116 88 Room Air  


 


8/22/17 16:48  74      











Physical Exam


GENERAL: Awake, alert, well-appearing, NAD, in good spirits


HENT: Normocephalic, atraumatic.


EYES: Normal conjunctiva. Sclera non-icteric.


NECK: Supple. No nuchal rigidity. FROM.


RESPIRATORY: Crackles in the right middle and lower lobe and left lower lobe


CARDIAC: RRR, no MRG


ABDOMEN: Soft, NTND, BS+


MSK: No chest wall TTP, 2+ bilateral pitting edema, mild redness near healing 

wounds, no caller, mild blanching.


NEURO: GCS 15, CN 2-12 intact, moves all 4s on command


SKIN: No rash or jaundice noted.





Medical Decision & Procedures


ER Provider


Diagnostic Interpretation:


X-ray: Per my interpretation, radiologist review. 





CHEST ONE VIEW PORTABLE





CLINICAL HISTORY: Weakness. Atrial fibrillation. History of congestive heart


failure.    





COMPARISON STUDY:  Chest radiograph June 21, 2017.





FINDINGS: No pneumothorax is present. A small right pleural effusion has


developed. There is a trace left pleural effusion. Left basilar opacity is


suggestive of atelectasis. Cardiomegaly is unchanged. Pulmonary vascular


congestion is noted with mild pulmonary edema. This has developed since prior


exam. 





IMPRESSION:  Interval development of mild pulmonary edema and a small right


pleural effusion. 





Electronically signed by:  Bandar Washington M.D.


8/22/2017 6:09 PM





Dictated Date/Time:  8/22/2017 6:06 PM





Laboratory Results


8/22/17 17:52








Red Blood Count 4.25, Mean Corpuscular Volume 75.3, Mean Corpuscular Hemoglobin 

22.8, Mean Corpuscular Hemoglobin Concent 30.3, Mean Platelet Volume 10.2, 

Neutrophils (%) (Auto) 85.9, Lymphocytes (%) (Auto) 4.5, Monocytes (%) (Auto) 

8.6, Eosinophils (%) (Auto) 0.5, Basophils (%) (Auto) 0.1, Neutrophils # (Auto) 

10.84, Lymphocytes # (Auto) 0.57, Monocytes # (Auto) 1.09, Eosinophils # (Auto) 

0.06, Basophils # (Auto) 0.01





8/22/17 17:52

















Test


  8/22/17


17:52 8/22/17


17:56 8/22/17


18:01 8/22/17


20:54


 


White Blood Count


  12.62 K/uL


(4.8-10.8) 


  


  


 


 


Red Blood Count


  4.25 M/uL


(4.2-5.4) 


  


  


 


 


Hemoglobin


  9.7 g/dL


(12.0-16.0) 


  


  


 


 


Hematocrit 32.0 % (37-47)    


 


Mean Corpuscular Volume


  75.3 fL


() 


  


  


 


 


Mean Corpuscular Hemoglobin


  22.8 pg


(25-34) 


  


  


 


 


Mean Corpuscular Hemoglobin


Concent 30.3 g/dl


(32-36) 


  


  


 


 


Platelet Count


  285 K/uL


(130-400) 


  


  


 


 


Mean Platelet Volume


  10.2 fL


(7.4-10.4) 


  


  


 


 


Neutrophils (%) (Auto) 85.9 %    


 


Lymphocytes (%) (Auto) 4.5 %    


 


Monocytes (%) (Auto) 8.6 %    


 


Eosinophils (%) (Auto) 0.5 %    


 


Basophils (%) (Auto) 0.1 %    


 


Neutrophils # (Auto)


  10.84 K/uL


(1.4-6.5) 


  


  


 


 


Lymphocytes # (Auto)


  0.57 K/uL


(1.2-3.4) 


  


  


 


 


Monocytes # (Auto)


  1.09 K/uL


(0.11-0.59) 


  


  


 


 


Eosinophils # (Auto)


  0.06 K/uL


(0-0.5) 


  


  


 


 


Basophils # (Auto)


  0.01 K/uL


(0-0.2) 


  


  


 


 


RDW Standard Deviation


  56.3 fL


(36.4-46.3) 


  


  


 


 


RDW Coefficient of Variation


  20.9 %


(11.5-14.5) 


  


  


 


 


Immature Granulocyte % (Auto) 0.4 %    


 


Immature Granulocyte # (Auto)


  0.05 K/uL


(0.00-0.02) 


  


  


 


 


Nucleated RBC Absolute Count


(auto) 0.02 K/uL


(0-0) 


  


  


 


 


Nucleated Red Blood Cells % 0.2 %    


 


Toxic Granulation 1+    


 


Hypochromasia PRESENT    


 


Anisocytosis PRESENT    


 


Prothrombin Time


  12.4 SECONDS


(9.0-12.0) 


  


  


 


 


Prothromb Time International


Ratio 1.2 (0.9-1.1) 


  


  


  


 


 


Activated Partial


Thromboplast Time 24.8 SECONDS


(21.0-31.0) 


  


  


 


 


Partial Thromboplastin Ratio 1.0    


 


Anion Gap


  8.0 mmol/L


(3-11) 


  


  


 


 


Est Creatinine Clear Calc


Drug Dose 41.5 ml/min 


  


  


  


 


 


Estimated GFR (


American) 44.2 


  


  


  


 


 


Estimated GFR (Non-


American 38.2 


  


  


  


 


 


BUN/Creatinine Ratio 10.8 (10-20)    


 


Calcium Level


  9.0 mg/dl


(8.5-10.1) 


  


  


 


 


Phosphorus Level


  1.9 mg/dl


(2.5-4.9) 


  


  


 


 


Magnesium Level


  1.7 mg/dl


(1.8-2.4) 


  


  


 


 


Total Bilirubin


  0.9 mg/dl


(0.2-1) 


  


  


 


 


Aspartate Amino Transf


(AST/SGOT) 45 U/L (15-37) 


  


  


  


 


 


Alanine Aminotransferase


(ALT/SGPT) 52 U/L (12-78) 


  


  


  


 


 


Alkaline Phosphatase


  77 U/L


() 


  


  


 


 


Pro-B-Type Natriuretic Peptide


  2137 pg/ml


(0-1800) 


  


  


 


 


Total Protein


  7.8 gm/dl


(6.4-8.2) 


  


  


 


 


Albumin


  3.4 gm/dl


(3.4-5.0) 


  


  


 


 


Globulin


  4.4 gm/dl


(2.5-4.0) 


  


  


 


 


Albumin/Globulin Ratio 0.8 (0.9-2)    


 


Bedside Troponin I


  


  0.030 ng/ml


(0-0.045) 


  


 


 


Urine Color   YELLOW  


 


Urine Appearance   CLEAR (CLEAR)  


 


Urine pH   7.0 (4.5-7.5)  


 


Urine Specific Gravity


  


  


  1.012


(1.000-1.030) 


 


 


Urine Protein   NEG (NEG)  


 


Urine Glucose (UA)   NEG (NEG)  


 


Urine Ketones   NEG (NEG)  


 


Urine Occult Blood   NEG (NEG)  


 


Urine Nitrite   NEG (NEG)  


 


Urine Bilirubin   NEG (NEG)  


 


Urine Urobilinogen   NEG (NEG)  


 


Urine Leukocyte Esterase   NEG (NEG)  


 


Urine WBC (Auto)   1-5 /hpf (0-5)  


 


Urine RBC (Auto)   0-4 /hpf (0-4)  


 


Urine Hyaline Casts (Auto)   0 /lpf (0-5)  


 


Urine Epithelial Cells (Auto)


  


  


  5-10 /lpf


(0-5) 


 


 


Urine Bacteria (Auto)   NEG (NEG)  


 


Venous Blood pH


  


  


  


  7.47


(7.36-7.41)


 


Venous Blood Partial Pressure


CO2 


  


  


  41 mmHg


(38.0-50.0)


 


Venous Blood Partial Pressure


O2 


  


  


  33 mmHg 


 


 


Venous Blood HCO3    29 mmol/L 


 


Venous Blood Oxygen Saturation    64.0 % 


 


Venous Blood Base Excess    5.2 mEq/L 





Laboratory results reviewed by me





Medications Administered











 Medications


  (Trade)  Dose


 Ordered  Sig/Gaby


 Route  Start Time


 Stop Time Status Last Admin


Dose Admin


 


 Furosemide 40 mg/


 Syringe  4 ml @ 4


 mls/min  ONE  STAT


 IV  8/22/17 18:51


 8/22/17 18:52 DC 8/22/17 18:51


4 MLS/MIN


 


 Magnesium Oxide


  (Mag-Ox Tab)  800 mg  ONE  STAT


 PO  8/22/17 19:36


 8/22/17 19:38 DC 8/22/17 20:24


800 MG


 


 Potassium/


 Phosphorus/Sodium


  (Phospha 250


 Neutral


 155-852-130 Mg)  2 tab  ONE  STAT


 PO  8/22/17 19:36


 8/22/17 19:38 DC 8/22/17 20:24


2 TAB


 


 Nitroglycerin


  (Nitrostat Tab)  0.4 mg  ONE  STAT


 SL  8/22/17 20:56


 8/22/17 20:58 DC 8/22/17 21:22


0.4 MG











ECG


Indication:  weakness


Rate (beats per minute):  74


Rhythm:  normal sinus


Findings:  PVC, Q waves (Anterior), ST depression (Lateral, in V5 and V6, 

Prolonged QTc)


Comparison ECG Date:  5/3/17


Change:


Lateral ST-depression in V5 and V6 are new





ED Course


1728: The patient was evaluated in room A02. A complete history and physical 

exam was performed.





1939: I reevaluated the patient. She is feeling better and is going to try an 

ambulatory trial.





2034: The patient had a successful ambulatory trial.





2057: Upon reexamination, the patient was resting and has no active chest pain. 

I discussed the test results and treatment plan with her. The patient will be 

evaluated for further management.





2112: I discussed the patient's case with Dr. Angeles [facility] Hospitalist. 

The patient will be evaluated for further treatment and management.





Medical Decision


The patient is an 82 year old white female with a past medical history of a-fib

, CHF, DM, and HTN who presents to the ED with a cc of constant, generalized 

weakness beginning this morning. Differential diagnosis includes: etiologies 

such as metabolic, infection, hypo/hyperglycemia, electrolyte abnormalities, 

cardiac sources, intracerebral event, toxicologic, neurologic, cellulitis, as 

well as others were entertained. 





Patient was fairly well-appearing of her exam was consistent with some volume 

overload.  Patient did have mildly elevated BNP and chest x-ray was concerning 

for interstitial pulmonary edema.  Patient denied any cough or chest pain.  

Patient is not complaining of any current shortness of breath.  Patient 

completed a walk test showed that she was not hypoxic.  Patient does have 

oxygen at home.  Upon reviewing the patient's EKG she had new ST depressions in 

V5 and V6.  Given the acute CHF exacerbation with new EKG changes I spoke with 

the medicine team.  Patient would benefit from further admission and workup and 

evaluation.  Patient was given some nitroglycerin given her CHF exacerbation 

which improved her blood pressure.





Medication Reconcilliation


Current Medication List:  was personally reviewed by me





Blood Pressure Screening


Patient's blood pressure:  Elevated blood pressure


Monitored by hospitalist





Consults


Time Called:  2059


Consulting Physician:  Dr. Angeles [facility] Hospitalist


Returned Call:  2112


I discussed the patient's case with Dr. Angeles [facility] Hospitalist. The 

patient will be evaluated for further treatment and management.





Impression





 Primary Impression:  


 EKG abnormalities


 Additional Impression:  


 CHF exacerbation





Scribe Attestation


The scribe's documentation has been prepared under my direction and personally 

reviewed by me in its entirety. I confirm that the note above accurately 

reflects all work, treatment, procedures, and medical decision making performed 

by me.





Departure Information


Dispostion


Being Evaluated By Hospitalist





Referrals


Sharan Olsen PA-C (PCP)





Patient Instructions


My [facility]





Problem Qualifiers








 Additional Impression:  


 CHF exacerbation


 Congestive heart failure type:  unspecified congestive heart failure type  

Qualified Codes:  I50.9 - Heart failure, unspecified

## 2017-08-23 VITALS
DIASTOLIC BLOOD PRESSURE: 66 MMHG | OXYGEN SATURATION: 94 % | HEART RATE: 62 BPM | SYSTOLIC BLOOD PRESSURE: 142 MMHG | TEMPERATURE: 98.6 F

## 2017-08-23 VITALS
HEART RATE: 67 BPM | DIASTOLIC BLOOD PRESSURE: 66 MMHG | OXYGEN SATURATION: 91 % | SYSTOLIC BLOOD PRESSURE: 143 MMHG | TEMPERATURE: 99.5 F

## 2017-08-23 VITALS
SYSTOLIC BLOOD PRESSURE: 142 MMHG | TEMPERATURE: 98.6 F | HEART RATE: 62 BPM | DIASTOLIC BLOOD PRESSURE: 66 MMHG | OXYGEN SATURATION: 94 %

## 2017-08-23 VITALS
HEART RATE: 83 BPM | SYSTOLIC BLOOD PRESSURE: 178 MMHG | TEMPERATURE: 98.06 F | DIASTOLIC BLOOD PRESSURE: 75 MMHG | OXYGEN SATURATION: 92 %

## 2017-08-23 VITALS
DIASTOLIC BLOOD PRESSURE: 72 MMHG | HEART RATE: 68 BPM | OXYGEN SATURATION: 93 % | SYSTOLIC BLOOD PRESSURE: 158 MMHG | TEMPERATURE: 97.88 F

## 2017-08-23 VITALS — OXYGEN SATURATION: 91 %

## 2017-08-23 LAB
CKMB/CK RATIO: 0.6 (ref 0–3)
CKMB/CK RATIO: 0.7 (ref 0–3)
EOSINOPHIL NFR BLD AUTO: 281 K/UL (ref 130–400)
HCT VFR BLD CALC: 31.2 % (ref 37–47)
MCH RBC QN AUTO: 22.1 PG (ref 25–34)
MCHC RBC AUTO-ENTMCNC: 28.8 G/DL (ref 32–36)
MCV RBC AUTO: 76.5 FL (ref 80–100)
PMV BLD AUTO: 9.9 FL (ref 7.4–10.4)
RBC # BLD AUTO: 4.08 M/UL (ref 4.2–5.4)
WBC # BLD AUTO: 14.91 K/UL (ref 4.8–10.8)

## 2017-08-23 RX ADMIN — INSULIN ASPART SCH UNITS: 100 INJECTION, SOLUTION INTRAVENOUS; SUBCUTANEOUS at 11:59

## 2017-08-23 RX ADMIN — INSULIN ASPART SCH UNITS: 100 INJECTION, SOLUTION INTRAVENOUS; SUBCUTANEOUS at 08:05

## 2017-08-23 NOTE — DISCHARGE INSTRUCTIONS
Discharge Instructions


Date of Service


Aug 23, 2017.





Admission


Reason for Admission:  Congestive Heart Failure





Discharge


Discharge Diagnosis / Problem:  mild heart failure





Discharge Goals


Goal(s):  Diagnostic testing, Therapeutic intervention





Activity Recommendations


Activity Limitations:  resume your previous activity





.





Current Hospital Diet


Patient's current hospital diet: Low Sodium Diet (2gm Na)





Discharge Diet


Recommended Diet:  Low Sodium Diet (2gm Na), Diabetes Type 2 Diet





Pending Studies


Studies pending at discharge:  no





Medical Emergencies








.


Who to Call and When:





Medical Emergencies:  If at any time you feel your situation is an emergency, 

please call 911 immediately.





.





Non-Emergent Contact


Non-Emergency issues call your:  Primary Care Provider, Cardiologist


Call Non-Emergent contact if:  temperature is above 101, your pain is unusual 

for you





.


.








"Provider Documentation" section prepared by Tyler Cavazos.








.





VTE Core Measure


Inpt VTE Proph given/why not?:  Unfractionated heparin SQ

## 2017-08-23 NOTE — DISCHARGE SUMMARY
Discharge Summary


Date of Service


Aug 23, 2017.





Discharge Summary


Admission Date:


Aug 22, 2017 at 22:49


Discharge Date:  Aug 23, 2017


Discharge Disposition:  Home with services


Principal Diagnosis:  garcia, susected acute diastolic heartfailure resolved


Immunizations:  


   Have You Had Influenza Vaccine:  Unknown


   History of Tetanus Vaccine?:  Unknown


   History of Pneumococcal:  Unknown


   History of Hepatitis B Vaccine:  Unknown





Medication Reconciliation


Continued Medications:  


Amiodarone Hcl (Cordarone) 200 Mg Tab


200 MG PO BID, TAB





Atorvastatin (Lipitor) 10 Mg Tab


10 MG PO HS, TAB





Carvedilol (Coreg) 6.25 Mg Tab


12.25 MG PO BID, TAB





Ferrous Sulfate (Ferrous Sulfate) 325 Mg Tab


325 MG PO DAILY





Fluticasone Furoate-Vilanterol (Breo Ellipta) 1 Inh Inh


1 PUFF PO DAILY





Fluticasone Propionate (Flovent Hfa) 120 Puffs/5280 Mcg Aero


2 PUFFS INH BID PRN for Shortness of Breath for 30 Days, #10.6 GM 2 Refills





Glipizide (Glucotrol) 10 Mg Tab


10 MG PO BID, TAB





Levothyroxine Sodium (Synthroid) 25 Mcg Tab


50 MCG PO DAILY, TAB





Losartan Potassium (Cozaar) 100 Mg Tab


100 MG PO DAILY, TAB





Magnesium Oxide (Magnesium) 500 Mg Cap


500 MG PO BID











Discharge Exam


Review of Systems:  


   Constitutional:  No fever, No chills


   Respiratory:  No cough, No sputum, No wheezing


   Cardiovascular:  No chest pain, No orthopnea, No edema


   Abdomen:  No pain, No nausea, No vomiting, No diarrhea


   Musculoskeletal:  No joint pain, No muscle pain, No swelling


   Genitourinary - Female:  No dysuria, No urinary frequency


   Psychiatric:  No depression symptoms, No anhedonism


Physical Exam:  


   General Appearance:  WD/WN, no apparent distress


   Eyes:  PERRL, EOMI


   Neck:  supple, no JVD


   Respiratory/Chest:  chest non-tender, lungs clear, normal breath sounds


   Cardiovascular:  regular rate, rhythm, no murmur


   Abdomen / GI:  normal bowel sounds, non tender, soft


   Extremities:  no pedal edema, normal range of motion


   Neurologic/Psychiatric:  alert, oriented x 3





Hospital Course


82 F presented with weakness at husbands dialysis session, found to have mild 

acute diastolic heart failure and some electrolyte deficiencies





.Acute on chronic diastolic CHF/atrial fibrillation/hypertension


Lasix 40 mg IV twice a day.


Potassium chloride BID.


Hold losartan.Continue carvedilol 12.25 mg by mouth twice a day, amiodarone 200 

mg by mouth twice a day.





Diabetes mellitus--hold metformin and glipizide. Accu-Cheks sliding scale





Hyperlipidemia--continue atorvastatin 10 mg by mouth at bedtime.


Total Time Spent:  Greater than 30 minutes


This includes examination of the patient, discharge planning, medication 

reconciliation, and communication with other providers.





Discharge Instructions


Please refer to the electronic Patient Visit Report (Discharge Instructions) 

for additional information.

## 2017-10-05 ENCOUNTER — HOSPITAL ENCOUNTER (INPATIENT)
Dept: HOSPITAL 45 - C.EDB | Age: 82
LOS: 2 days | Discharge: HOME HEALTH | DRG: 291 | End: 2017-10-07
Attending: HOSPITALIST | Admitting: HOSPITALIST
Payer: COMMERCIAL

## 2017-10-05 VITALS
HEART RATE: 49 BPM | SYSTOLIC BLOOD PRESSURE: 135 MMHG | TEMPERATURE: 98.06 F | DIASTOLIC BLOOD PRESSURE: 79 MMHG | OXYGEN SATURATION: 97 %

## 2017-10-05 VITALS
OXYGEN SATURATION: 98 % | TEMPERATURE: 97.52 F | DIASTOLIC BLOOD PRESSURE: 99 MMHG | SYSTOLIC BLOOD PRESSURE: 163 MMHG | HEART RATE: 62 BPM

## 2017-10-05 VITALS — OXYGEN SATURATION: 96 %

## 2017-10-05 VITALS
WEIGHT: 207.9 LBS | HEIGHT: 68 IN | WEIGHT: 207.9 LBS | BODY MASS INDEX: 31.51 KG/M2 | BODY MASS INDEX: 31.51 KG/M2 | HEIGHT: 68 IN

## 2017-10-05 VITALS
SYSTOLIC BLOOD PRESSURE: 126 MMHG | HEART RATE: 46 BPM | DIASTOLIC BLOOD PRESSURE: 73 MMHG | OXYGEN SATURATION: 96 % | TEMPERATURE: 97.7 F

## 2017-10-05 VITALS
OXYGEN SATURATION: 99 % | SYSTOLIC BLOOD PRESSURE: 130 MMHG | DIASTOLIC BLOOD PRESSURE: 73 MMHG | HEART RATE: 51 BPM | TEMPERATURE: 97.7 F

## 2017-10-05 VITALS — OXYGEN SATURATION: 93 %

## 2017-10-05 DIAGNOSIS — N18.3: ICD-10-CM

## 2017-10-05 DIAGNOSIS — I13.0: Primary | ICD-10-CM

## 2017-10-05 DIAGNOSIS — E03.9: ICD-10-CM

## 2017-10-05 DIAGNOSIS — Z99.81: ICD-10-CM

## 2017-10-05 DIAGNOSIS — J44.9: ICD-10-CM

## 2017-10-05 DIAGNOSIS — E11.65: ICD-10-CM

## 2017-10-05 DIAGNOSIS — I48.91: ICD-10-CM

## 2017-10-05 DIAGNOSIS — Z79.899: ICD-10-CM

## 2017-10-05 DIAGNOSIS — Z87.891: ICD-10-CM

## 2017-10-05 DIAGNOSIS — Z79.84: ICD-10-CM

## 2017-10-05 DIAGNOSIS — I50.33: ICD-10-CM

## 2017-10-05 DIAGNOSIS — E11.22: ICD-10-CM

## 2017-10-05 LAB
ALBUMIN/GLOB SERPL: 0.9 {RATIO} (ref 0.9–2)
ALP SERPL-CCNC: 71 U/L (ref 45–117)
ALT SERPL-CCNC: 47 U/L (ref 12–78)
ANION GAP SERPL CALC-SCNC: 7 MMOL/L (ref 3–11)
ANISOCYTOSIS BLD QL SMEAR: PRESENT
APPEARANCE UR: CLEAR
AST SERPL-CCNC: 43 U/L (ref 15–37)
B-OH-BUTYR SERPL-SCNC: 5.5 MG/DL (ref 0.2–2.81)
BASOPHILS # BLD: 0.02 K/UL (ref 0–0.2)
BASOPHILS NFR BLD: 0.2 %
BILIRUB UR-MCNC: (no result) MG/DL
BUN SERPL-MCNC: 20 MG/DL (ref 7–18)
BUN/CREAT SERPL: 14.3 (ref 10–20)
CALCIUM SERPL-MCNC: 9.2 MG/DL (ref 8.5–10.1)
CHLORIDE SERPL-SCNC: 103 MMOL/L (ref 98–107)
CO2 SERPL-SCNC: 30 MMOL/L (ref 21–32)
COLOR UR: YELLOW
COMPLETE: YES
CREAT CL PREDICTED SERPL C-G-VRATE: 38.1 ML/MIN
CREAT SERPL-MCNC: 1.4 MG/DL (ref 0.6–1.2)
EOSINOPHIL NFR BLD AUTO: 178 K/UL (ref 130–400)
GLOBULIN SER-MCNC: 4 GM/DL (ref 2.5–4)
GLUCOSE SERPL-MCNC: 308 MG/DL (ref 70–99)
HCT VFR BLD CALC: 39.4 % (ref 37–47)
HYPOCHROMIA BLD QL SMEAR: PRESENT
IG%: 0.3 %
IMM GRANULOCYTES NFR BLD AUTO: 7.5 %
INR PPP: 1.2 (ref 0.9–1.1)
LYMPHOCYTES # BLD: 0.67 K/UL (ref 1.2–3.4)
MANUAL MICROSCOPIC REQUIRED?: NO
MCH RBC QN AUTO: 27.5 PG (ref 25–34)
MCHC RBC AUTO-ENTMCNC: 30.7 G/DL (ref 32–36)
MCV RBC AUTO: 89.5 FL (ref 80–100)
MONOCYTES NFR BLD: 5.7 %
NEUTROPHILS # BLD AUTO: 0.4 %
NEUTROPHILS NFR BLD AUTO: 85.9 %
NITRITE UR QL STRIP: (no result)
PARTIAL THROMBOPLASTIN RATIO: 1
PH UR STRIP: 7 [PH] (ref 4.5–7.5)
PMV BLD AUTO: 10.6 FL (ref 7.4–10.4)
POLYCHROMASIA BLD QL SMEAR: (no result)
POTASSIUM SERPL-SCNC: 4.5 MMOL/L (ref 3.5–5.1)
PROTHROMBIN TIME: 13.1 SECONDS (ref 9–12)
RBC # BLD AUTO: 4.4 M/UL (ref 4.2–5.4)
REVIEW REQ?: NO
SODIUM SERPL-SCNC: 140 MMOL/L (ref 136–145)
SP GR UR STRIP: 1.02 (ref 1–1.03)
URINE BILL WITH OR WITHOUT MIC: (no result)
URINE EPITHELIAL CELL AUTO: >30 /LPF (ref 0–5)
UROBILINOGEN UR-MCNC: (no result) MG/DL
WBC # BLD AUTO: 8.97 K/UL (ref 4.8–10.8)
ZZUR CULT IF INDIC CLEAN CATCH: NO

## 2017-10-05 RX ADMIN — INSULIN ASPART SCH UNITS: 100 INJECTION, SOLUTION INTRAVENOUS; SUBCUTANEOUS at 20:48

## 2017-10-05 RX ADMIN — INSULIN ASPART SCH UNITS: 100 INJECTION, SOLUTION INTRAVENOUS; SUBCUTANEOUS at 12:38

## 2017-10-05 RX ADMIN — HEPARIN SODIUM SCH UNIT: 10000 INJECTION, SOLUTION INTRAVENOUS; SUBCUTANEOUS at 20:47

## 2017-10-05 RX ADMIN — Medication SCH MG: at 20:47

## 2017-10-05 RX ADMIN — INSULIN ASPART SCH UNITS: 100 INJECTION, SOLUTION INTRAVENOUS; SUBCUTANEOUS at 17:15

## 2017-10-05 RX ADMIN — INSULIN GLARGINE SCH UNITS: 100 INJECTION, SOLUTION SUBCUTANEOUS at 20:49

## 2017-10-05 RX ADMIN — ATORVASTATIN CALCIUM SCH MG: 10 TABLET, FILM COATED ORAL at 20:50

## 2017-10-05 RX ADMIN — CARVEDILOL SCH MG: 12.5 TABLET, FILM COATED ORAL at 20:39

## 2017-10-05 RX ADMIN — LOSARTAN POTASSIUM SCH MG: 50 TABLET, FILM COATED ORAL at 12:40

## 2017-10-05 RX ADMIN — CARVEDILOL SCH MG: 12.5 TABLET, FILM COATED ORAL at 12:39

## 2017-10-05 RX ADMIN — AMIODARONE HYDROCHLORIDE SCH MG: 200 TABLET ORAL at 20:49

## 2017-10-05 RX ADMIN — POTASSIUM CITRATE SCH MEQ: 10 TABLET ORAL at 12:41

## 2017-10-05 NOTE — HISTORY AND PHYSICAL
History & Physical


Date & Time of Service:


Oct 5, 2017 at 10:06


Chief Complaint:


Shortness Of Breath


Primary Care Physician:


Sharan Olsen PA-C


History of Present Illness


Source:  patient


This is a 83 yo F with PMHX of cardiomyopathy, Acute on chronic diastolic CHF 

with EF of 55-60% with last echo in June 2017, COPD, atrial fibrillation s/p 

cardioversion twice while on amiodarone, HTN,  anemia, hx remote tobacco use, 

hx of H. pylori, gastritis, and hypothyroidism who presents with worsening sob 

x 3 days.  She notes has been on an increased dose of Lasix 80 mg in split 

doses since the end of August.  She was going to the heart failure clinic and 

seeing Dr. [place]/Charlotte Greenberg.  This morning the patient was acutely short 

of breath upon waking up so her  called EMS.  Pt notes she missed one of 

her Lasix doses (40 mg) yesterday because of going grocery shopping and likely 

has missed other afternoon doses.  Typically she wears 2L O2 via NC while 

awake.  She admits to increased swelling in her feet and lower legs over the 

past few days.  She does not weigh herself daily, and is unsure if she's gained 

weight recently.





BNP is elevated at 2068, Troponin is 0.047, Cr is 1.4, and has + ketones at 5.5 

with glucose of 308, no anion gap.


CXR showing pulmonary edema with bilateral pleural effusions.


EKG reviewed showing NSR without acute ST wave inversion changes or ischemia.





Past Medical/Surgical History


Medical Problems:


(1) Asthma


Status: Chronic  





Cardiomyopathy


Acute on chronic diastolic CHF with EF of 55-60% with last echo in June 2017


COPD


Atrial fibrillation


HTN


Anemia


Hx remote tobacco use


Hx H. pylori


Chronic gastritis


Diverticulosis





Family History





Patient reports no known family medical history.





Social History


Smoking Status:  Former Smoker


Drug Use:  none


Marital Status:  


Housing status:  lives with family, other


Occupational Status:  retired





Immunizations


History of Influenza Vaccine:  Unknown


History of Tetanus Vaccine?:  Unknown


History of Pneumococcal:  Unknown


History of Hepatitis B Vaccine:  Unknown





Multi-Drug Resistant Organisms


History of MDRO:  No





Allergies


Coded Allergies:  


     Oxytetracycline (Unverified  Allergy, Unknown, RASH, 10/5/17)


     Polymyxin B (Unverified  Allergy, Unknown, RASH, 10/5/17)





Home Medications


Scheduled


Amiodarone Hcl (Cordarone), 200 MG PO BID


Ascorbic Acid (Vitamin C 500 mg), 500 MG PO DAILY


Atorvastatin (Lipitor), 10 MG PO HS


Carvedilol (Coreg), 12.25 MG PO BID


Ferrous Sulfate (Ferrous Sulfate), 325 MG PO DAILY


Fluticasone Furoate-Vilanterol (Breo Ellipta), 1 PUFF PO DAILY


Furosemide (Lasix), 40 MG PO DAILY


Glipizide (Glipizide Er), 10 MG PO BID


Levothyroxine Sodium (Synthroid), 50 MCG PO DAILY


Losartan Potassium (Cozaar), 100 MG PO DAILY


Magnesium Oxide (Magnesium), 500 MG PO BID





Scheduled PRN


Levalbuterol Tartrate (Levalbuterol Tartrate Hfa), 2 PUFFS INH Q4 PRN for 

Shortness of Breath





Review of Systems


Constitutional:  No fever, No chills, No sweats, No fatigue


Eyes:  No redness, No diplopia


ENT:  No tinnitus, No trouble swallowing


Respiratory:  No shortness of breath, No dyspnea on exertion, No hemoptysis


Cardiovascular:  + orthopnea, + edema, + problem reported (chest tightness), No 

chest pain


Abdomen:  No pain, No nausea, No vomiting, No diarrhea, No constipation


Musculoskeletal:  + swelling, No joint pain, No calf pain


Genitourinary - Female:  No dysuria


Neurologic:  + problem reported (uses walker for ambulation when out of house), 

No weakness, No numbness/tingling


Endocrine:  No fatigue


Integumentary:  No rash, No itch





Physical Exam


Vital Signs











  Date Time  Temp Pulse Resp B/P (MAP) Pulse Ox O2 Delivery O2 Flow Rate FiO2


 


10/5/17 09:38  68 20 158/75 91 Nasal Cannula 6.0 


 


10/5/17 08:32     91 Nasal Cannula 6.0 


 


10/5/17 08:31  63      


 


10/5/17 08:24 36.4 72 22 175/102 82 Room Air  


 


10/5/17 08:24     82 Room Air  


 


10/5/17 08:24     82 Room Air  








General Appearance:  WD/WN, no apparent distress, + obese


Head:  normocephalic, atraumatic


Eyes:  normal inspection, EOMI


ENT:  hearing grossly normal, pharynx normal, + pertinent finding (MMM)


Neck:  supple, + JVD (mild)


Respiratory/Chest:  + pertinent finding (On 2 L via NC, diminished breath 

sounds throughout with + crackles at bilateral bases. )


Cardiovascular:  regular rate, rhythm, no murmur, normal peripheral pulses, + 

JVD (mild)


Abdomen/GI:  normal bowel sounds, non tender, soft


Back:  normal inspection


Extremities/Musculoskelatal:  no calf tenderness, + pedal edema (2+ pitting up 

into thighs)


Neurologic/Psych:  alert, normal reflexes, oriented x 3


Skin:  normal color, warm/dry, + pertinent finding (multiple areas of small 

scabbing lesions over BLE)





Diagnostics


Laboratory Results





Results Past 24 Hours








Test


  10/5/17


08:40 10/5/17


09:05 Range/Units


 


 


White Blood Count 8.97  4.8-10.8  K/uL


 


Red Blood Count 4.40  4.2-5.4  M/uL


 


Hemoglobin 12.1  12.0-16.0  g/dL


 


Hematocrit 39.4  37-47  %


 


Mean Corpuscular Volume 89.5    fL


 


Mean Corpuscular Hemoglobin 27.5  25-34  pg


 


Mean Corpuscular Hemoglobin


Concent 30.7


  


  32-36  g/dl


 


 


Platelet Count 178  130-400  K/uL


 


Mean Platelet Volume 10.6  7.4-10.4  fL


 


Neutrophils (%) (Auto) 85.9   %


 


Lymphocytes (%) (Auto) 7.5   %


 


Monocytes (%) (Auto) 5.7   %


 


Eosinophils (%) (Auto) 0.4   %


 


Basophils (%) (Auto) 0.2   %


 


Neutrophils # (Auto) 7.70  1.4-6.5  K/uL


 


Lymphocytes # (Auto) 0.67  1.2-3.4  K/uL


 


Monocytes # (Auto) 0.51  0.11-0.59  K/uL


 


Eosinophils # (Auto) 0.04  0-0.5  K/uL


 


Basophils # (Auto) 0.02  0-0.2  K/uL


 


RDW Standard Deviation 84.5  36.4-46.3  fL


 


RDW Coefficient of Variation 26.4  11.5-14.5  %


 


Immature Granulocyte % (Auto) 0.3   %


 


Immature Granulocyte # (Auto) 0.03  0.00-0.02  K/uL


 


Polychromasia 1+   


 


Hypochromasia PRESENT   


 


Anisocytosis PRESENT   


 


Prothrombin Time


  13.1


  


  9.0-12.0


SECONDS


 


Prothromb Time International


Ratio 1.2


  


  0.9-1.1  


 


 


Activated Partial


Thromboplast Time 24.9


  


  21.0-31.0


SECONDS


 


Partial Thromboplastin Ratio 1.0   


 


Sodium Level 140  136-145  mmol/L


 


Potassium Level 4.5  3.5-5.1  mmol/L


 


Chloride Level 103    mmol/L


 


Carbon Dioxide Level 30  21-32  mmol/L


 


Anion Gap 7.0  3-11  mmol/L


 


Blood Urea Nitrogen 20  7-18  mg/dl


 


Creatinine


  1.40


  


  0.60-1.20


mg/dl


 


Est Creatinine Clear Calc


Drug Dose 38.1


  


   ml/min


 


 


Estimated GFR (


American) 40.5


  


   


 


 


Estimated GFR (Non-


American 34.9


  


   


 


 


BUN/Creatinine Ratio 14.3  10-20  


 


Random Glucose 308  70-99  mg/dl


 


Calcium Level 9.2  8.5-10.1  mg/dl


 


Total Bilirubin 0.9  0.2-1  mg/dl


 


Aspartate Amino Transf


(AST/SGOT) 43


  


  15-37  U/L


 


 


Alanine Aminotransferase


(ALT/SGPT) 47


  


  12-78  U/L


 


 


Alkaline Phosphatase 71    U/L


 


Troponin I 0.047  0-0.045  ng/ml


 


Pro-B-Type Natriuretic Peptide 2068  0-1800  pg/ml


 


Total Protein 7.4  6.4-8.2  gm/dl


 


Albumin 3.4  3.4-5.0  gm/dl


 


Globulin 4.0  2.5-4.0  gm/dl


 


Albumin/Globulin Ratio 0.9  0.9-2  


 


Beta-Hydroxybutyric Acid 5.50  0.2-2.81  mg/dL


 


Urine Color  YELLOW  


 


Urine Appearance  CLEAR CLEAR  


 


Urine pH  7.0 4.5-7.5  


 


Urine Specific Gravity  1.016 1.000-1.030  


 


Urine Protein  1+ NEG  


 


Urine Glucose (UA)  NEG NEG  


 


Urine Ketones  NEG NEG  


 


Urine Occult Blood  NEG NEG  


 


Urine Nitrite  NEG NEG  


 


Urine Bilirubin  NEG NEG  


 


Urine Urobilinogen  NEG NEG  


 


Urine Leukocyte Esterase  NEG NEG  


 


Urine WBC (Auto)  1-5 0-5  /hpf


 


Urine RBC (Auto)  0-4 0-4  /hpf


 


Urine Hyaline Casts (Auto)  5-10 0-5  /lpf


 


Urine Epithelial Cells (Auto)  >30 0-5  /lpf


 


Urine Bacteria (Auto)  NEG NEG  











Diagnostic Radiology


CHEST ONE VIEW PORTABLE





CLINICAL HISTORY: 82 years-old Female presenting with EVALUATE RESPIRATORY


DISTRESS.DYSPNEA. 





TECHNIQUE: Portable upright AP view of the chest was obtained.





COMPARISON:  8/22/2017.





FINDINGS:


Cardiac silhouette remains enlarged. Slight interval increase in pulmonary


vascular prominence. Interval increase in bronchial wall thickening, perihilar


vascular indistinctness, and hazy bilateral opacities with a mid to basilar


predominance. Small bilateral pleural effusions mid and be present. Mild


hyperinflation, unchanged. Osseous structures normal. Upper abdomen normal.





IMPRESSION:


1.  Cardiomegaly with findings consistent with worsened pulmonary edema.


Aspiration or infection cannot be excluded.





2.  small bilateral pleural effusions unchanged.











Electronically signed by:  Jared Floyd M.D.


10/5/2017 8:58 AM





Dictated Date/Time:  10/5/2017 8:56 AM





 The status of this report is Signed.





EKG


Vent. rate 67 BPM


LA interval 202 ms


QRS duration 92 ms


QT/QTc 470/496 ms


P-R-T axes 74 86 69





Normal sinus rhythm


Low voltage QRS


Cannot rule out Anteroseptal infarct (cited on or before 15-DEC-2016)


Prolonged QT


Abnormal ECG


When compared with ECG of 22-AUG-2017 16:54,


Premature ventricular complexes are no longer Present


Nonspecific T wave abnormality no longer evident in Inferior leads


QT has shortened


Confirmed by JULIAN SHERMAN ([id number]) on 10/5/2017 9:02:16 AM





Impression


Assessment and Plan


This is a 83 yo F with PMHX of cardiomyopathy, Acute on chronic diastolic CHF 

with EF of 55-60% with last echo in June 2017, COPD, atrial fibrillation s/p 

cardioversion twice while on amiodarone, HTN,  anemia, hx remote tobacco use, 

hx of H. pylori, gastritis, hypothyroidism,  who presents with worsening sob x 

3 days.  





Acute on chronic CHF exacerbation


- Admit to tele


- Exacerbation likely due to multiple missed afternoon doses of lasix with 

needs for ADLs. Pt notes her most recently missed dose was yesterday afternoon.


- Lasix 40 mg IV given in the ED, will order another IV push likely early this 

afternoon if outs remain good. Lasix 40 mg IV ordered for angel morning


- Will trend troponin, initial is 0.047


- Last echo completed at end of June 2017 showing preserved EF 55-60%. No need 

to repeat currently


- CXR reviewed as above


- Consider cardiology consult as she is being followed by Dr. Syzmanski in the 

HF clinic as outpatient


- Check lipids, A1C,  Morning EKG ordered, Strict I/Os, daily weights


- Cont carvedilol 12.5 BID, losartan 100 mg daily


- PT/OT 


- Add potassium supplementation with aggressive diuresis





Atrial Fibrillation


- Hx of cardioversion x 2 in past year, continue amiodarone 200 mg BID, 

carvedilol 12.5 mg BID





COPD


- Continue Breo inh and xopenex


- Follows with Dr. Mathis as outpatient





DM II


- Glucose elevated >300 with + ketones but no AG - pt did not take morning 

medications - Will order 1x dose Lantus 10 mg this morning to cover.


- ISS with accuchecks ordered


- Will hold glipizide 10 mg and metformin 850 mg BID for now with elevated Cr. 





CKD stage III


- Cr 1.5, trend with BMP, baseline 1.3-1.4





Hypothyroidism


- Cont Synthroid 50 mcg daily





Hx of H. pylori in July 2017 - noted





DVT ppx: heparin subq, teds, scds





CODE STATUS: FULL CODE





Disposition: From home, lives with , CM to assist with d/c planning





Level of Care


Telemetry





Advanced Directives


Existing Advance Directive:  Yes


Existing Living Will:  Yes


Existing Power of :  Yes





Resuscitation Status


FULL RESUSCITATION





VTE Prophylaxis


Risk Level:  Low


Given or contraindicated:  Unfractionated heparin SQ, T.E.D. Stockings, SCD's





Note


Attending Admission Note & Attestation:


Pt seen/examined, chart reviewed, care plan d/w DARLINE Parekh.


I agree w/ the key components of her documentation. 





81yo female with history of COPD, chronic respiratory failure on home O2, 

chronic diastolic CHF, atrial fibrillation, HTN, and hypothyroidism 


presenting with worsening edema, dyspnea, orthopnea, and abdominal swelling.  

She also reports an upper abdominal tightness sensation and lower chest 

tightness as well.


She admits to poor compliance with her lasix as well as her diet (admits to 

excessive salt intake).  In the ER was given IV lasix after cxr demonstrated 

pulmonary edema.  


I saw the patient on the telemetry floor and she reported feeling better 

already following the lasix. 





PMH, PSH, allergies, meds, sochx, famhx, ros - reviewed 





VSS


afebrile





gen - nad


neck - JVD present


heart - regular rate, extra beats, no murmur, s1, s2


lungs - bibasilar rales, about 1/2 way up back; decreased BS bases


abd - soft, NT, BS+, no liver palpable


ext - 2+ edema all the way to the knees; venous stasis changes on shins


skin - varicose veins legs/feet





labs - 


Cr 1.4


minimal trop elevation


BNP elevated


EKG - NSR, no ST changes


cxr - edema





A/P:


1.  acute/chronic diastolic CHF - likely due to noncompliance with meds, diet, 

etc. 


Lasix 40mg IV BID. 


Oddly her weight today is LOWER than the last few weights in the office.  But 

clinically appears to have decompensated CHF.





Could she have an element of interstitial lung disease from amiodarone?





Repeat BMP in am.


Consider repeat cxr after she has diuresed to reassess lung markings.





2.  +troponin - likely myocardial demand ischemia in setting of #1.  Cannot 

rule out true ACS.  


Chest symptoms likely due to CHF but will run serial troponins to be complete. 





3.  hypothyroidism - review of EMR shows last several TSHs all high. 


Check TSH in am. 


Decompensated hypothyroidism could contribute to #1 above. 





4.  a. fib - she is in NSR.  Cont tele.  Cont amiodarone to maintain NSR.  


Unsure why she is not on anticoagulation.





5.  T2DM - uncontrolled; last hemoglobin a1c was quite high; recheck in am.


start lantus 10 units BID


start novolog w/ meals - correction 30, carb ratio 1:10





other plans per Ms. Stephensiria.  





Zachery Gleason MD





Additional Copies To


Sharan Olsen PA-C

## 2017-10-05 NOTE — DIAGNOSTIC IMAGING REPORT
CHEST ONE VIEW PORTABLE



CLINICAL HISTORY: 82 years-old Female presenting with EVALUATE RESPIRATORY

DISTRESS.DYSPNEA. 



TECHNIQUE: Portable upright AP view of the chest was obtained.



COMPARISON:  8/22/2017.



FINDINGS:

Cardiac silhouette remains enlarged. Slight interval increase in pulmonary

vascular prominence. Interval increase in bronchial wall thickening, perihilar

vascular indistinctness, and hazy bilateral opacities with a mid to basilar

predominance. Small bilateral pleural effusions mid and be present. Mild

hyperinflation, unchanged. Osseous structures normal. Upper abdomen normal.



IMPRESSION:

1.  Cardiomegaly with findings consistent with worsened pulmonary edema.

Aspiration or infection cannot be excluded.



2.  small bilateral pleural effusions unchanged.







Electronically signed by:  Jared Floyd M.D.

10/5/2017 8:58 AM



Dictated Date/Time:  10/5/2017 8:56 AM

## 2017-10-05 NOTE — EMERGENCY ROOM VISIT NOTE
History


Report prepared by Gume:  Meredith Barajas


Under the Supervision of:  Dr. Godwin Conway M.D.


First contact with patient:  08:19


Chief Complaint:  SHORTNESS OF BREATH


Stated Complaint:  SHORTNESS OF BREATH





History of Present Illness


The patient is an 82 year old female who presents to the Emergency Room with 

complaints of worsening shortness of breath over the last two days.  Per 

nursing staff the patient arrives via ALS from home with two days of increasing 

shortness of breath.  Nursing staff reports that the patient wears 2 liters of 

nasal cannula oxygen at home.  They report that the patient recently had an 

increase in her Lasix dosage.  Nursing staff states that the patient has a 

history of diabetes and note that her blood glucose was 300 mg/dL today.  

Nursing staff states that the patient was given a nebulizer treatment en-route 

to the emergency department.  The patient denies any increase in cough, fever, 

or vomiting, but states that her shortness of breath has been increasing.  She 

states that she feels her leg swelling has improved since the change in her 

Lasix.  The patient states that she has been using inhalers at home for her 

shortness of breath.  She states that she smoked several years ago and reports 

a history of asthma.  The patient states that she had difficulty getting out of 

bed this morning due to her shortness of breath.  The patient denies being on 

any blood thinners.  She states that she only took her blood pressure 

medication this morning, denying taking her Lasix.  Nursing staff additionally 

notes that the patient was given 3 sprays of nitroglycerin in route.





   Source of History:  patient


   Onset:  last two days


   Position:  other (global)


   Quality:  other (shortness of breath)


   Timing:  worsening


   Associated Symptoms:  No fevers, No cough, No vomiting





Review of Systems


See HPI for pertinent positives & negatives. A total of 10 systems reviewed and 

were otherwise negative.





Past Medical & Surgical


Medical Problems:


(1) Acute exacerbation of CHF (congestive heart failure)


(2) Afib


(3) Asthma


(4) Congestive heart failure


(5) History of high blood pressure


(6) HTN (hypertension)


(7) Hyperlipidemia


(8) Systolic CHF, acute


(9) T2DM (type 2 diabetes mellitus)








Family History





Patient reports no known family medical history.





Social History


Smoking Status:  Former Smoker


Alcohol Use:  none


Drug Use:  none


Marital Status:  


Occupation Status:  retired





Current/Historical Medications


Scheduled


Amiodarone Hcl (Cordarone), 200 MG PO BID


Ascorbic Acid (Vitamin C 500 mg), 500 MG PO DAILY


Atorvastatin (Lipitor), 10 MG PO HS


Carvedilol (Coreg), 12.25 MG PO BID


Ferrous Sulfate (Ferrous Sulfate), 325 MG PO DAILY


Fluticasone Furoate-Vilanterol (Breo Ellipta), 1 PUFF PO DAILY


Furosemide (Lasix), 40 MG PO DAILY


Glipizide (Glipizide Er), 10 MG PO BID


Levothyroxine Sodium (Synthroid), 50 MCG PO DAILY


Losartan Potassium (Cozaar), 100 MG PO DAILY


Magnesium Oxide (Magnesium), 500 MG PO BID





Scheduled PRN


Levalbuterol Tartrate (Levalbuterol Tartrate Hfa), 2 PUFFS INH Q4 PRN for 

Shortness of Breath





Allergies


Coded Allergies:  


     Oxytetracycline (Unverified  Allergy, Unknown, RASH, 10/5/17)


     Polymyxin B (Unverified  Allergy, Unknown, RASH, 10/5/17)





Physical Exam


Vital Signs











  Date Time  Temp Pulse Resp B/P (MAP) Pulse Ox O2 Delivery O2 Flow Rate FiO2


 


10/5/17 09:38  68 20 158/75 91 Nasal Cannula 6.0 


 


10/5/17 08:32     91 Nasal Cannula 6.0 


 


10/5/17 08:31  63      


 


10/5/17 08:24 36.4 72 22 175/102 82 Room Air  


 


10/5/17 08:24     82 Room Air  


 


10/5/17 08:24     82 Room Air  











Physical Exam


GENERAL: Patient is in no acute distress.


HEENT: No acute trauma, normocephalic atraumatic, mucous membranes moist, no 

nasal congestion, no scleral icterus.


NECK: No stridor, no adenopathy, no meningismus, trachea is midline.


LUNGS: Equal breath sounds with crackles bilaterally, decreased breath sounds 

bilaterally


HEART: 2/6 systolic murmur, regular rate and rhythm.


ABDOMEN: Soft, nontender, bowel sounds positive, no hernias, no peritonitis.


EXTREMITIES: No cyanosis, moderate bilateral pedal edema, full range of motion 

of all the joints without pain or difficulty, no signs for acute trauma.


NEUROLOGIC: Oriented x 3, no acute motor or sensory deficits, no focal weakness.


SKIN: No rash, no jaundice, no diaphoresis.





Medical Decision & Procedures


ER Provider


Diagnostic Interpretation:


X-ray results as stated below per interpretation by me and the radiologist: 





CHEST ONE VIEW PORTABLE





CLINICAL HISTORY: 82 years-old Female presenting with EVALUATE RESPIRATORY


DISTRESS.DYSPNEA. 





TECHNIQUE: Portable upright AP view of the chest was obtained.





COMPARISON:  8/22/2017.





FINDINGS:


Cardiac silhouette remains enlarged. Slight interval increase in pulmonary


vascular prominence. Interval increase in bronchial wall thickening, perihilar


vascular indistinctness, and hazy bilateral opacities with a mid to basilar


predominance. Small bilateral pleural effusions mid and be present. Mild


hyperinflation, unchanged. Osseous structures normal. Upper abdomen normal.





IMPRESSION:


1.  Cardiomegaly with findings consistent with worsened pulmonary edema.


Aspiration or infection cannot be excluded.





2.  small bilateral pleural effusions unchanged.





Electronically signed by:  Jared Floyd M.D.


10/5/2017 8:58 AM





Dictated Date/Time:  10/5/2017 8:56 AM





Laboratory Results


10/5/17 08:40








Red Blood Count 4.40, Mean Corpuscular Volume 89.5, Mean Corpuscular Hemoglobin 

27.5, Mean Corpuscular Hemoglobin Concent 30.7, Mean Platelet Volume 10.6, 

Neutrophils (%) (Auto) 85.9, Lymphocytes (%) (Auto) 7.5, Monocytes (%) (Auto) 

5.7, Eosinophils (%) (Auto) 0.4, Basophils (%) (Auto) 0.2, Neutrophils # (Auto) 

7.70, Lymphocytes # (Auto) 0.67, Monocytes # (Auto) 0.51, Eosinophils # (Auto) 

0.04, Basophils # (Auto) 0.02





10/5/17 08:40

















Test


  10/5/17


08:40 10/5/17


09:05


 


White Blood Count


  8.97 K/uL


(4.8-10.8) 


 


 


Red Blood Count


  4.40 M/uL


(4.2-5.4) 


 


 


Hemoglobin


  12.1 g/dL


(12.0-16.0) 


 


 


Hematocrit 39.4 % (37-47)  


 


Mean Corpuscular Volume


  89.5 fL


() 


 


 


Mean Corpuscular Hemoglobin


  27.5 pg


(25-34) 


 


 


Mean Corpuscular Hemoglobin


Concent 30.7 g/dl


(32-36) 


 


 


Platelet Count


  178 K/uL


(130-400) 


 


 


Mean Platelet Volume


  10.6 fL


(7.4-10.4) 


 


 


Neutrophils (%) (Auto) 85.9 %  


 


Lymphocytes (%) (Auto) 7.5 %  


 


Monocytes (%) (Auto) 5.7 %  


 


Eosinophils (%) (Auto) 0.4 %  


 


Basophils (%) (Auto) 0.2 %  


 


Neutrophils # (Auto)


  7.70 K/uL


(1.4-6.5) 


 


 


Lymphocytes # (Auto)


  0.67 K/uL


(1.2-3.4) 


 


 


Monocytes # (Auto)


  0.51 K/uL


(0.11-0.59) 


 


 


Eosinophils # (Auto)


  0.04 K/uL


(0-0.5) 


 


 


Basophils # (Auto)


  0.02 K/uL


(0-0.2) 


 


 


RDW Standard Deviation


  84.5 fL


(36.4-46.3) 


 


 


RDW Coefficient of Variation


  26.4 %


(11.5-14.5) 


 


 


Immature Granulocyte % (Auto) 0.3 %  


 


Immature Granulocyte # (Auto)


  0.03 K/uL


(0.00-0.02) 


 


 


Polychromasia 1+  


 


Hypochromasia PRESENT  


 


Anisocytosis PRESENT  


 


Prothrombin Time


  13.1 SECONDS


(9.0-12.0) 


 


 


Prothromb Time International


Ratio 1.2 (0.9-1.1) 


  


 


 


Activated Partial


Thromboplast Time 24.9 SECONDS


(21.0-31.0) 


 


 


Partial Thromboplastin Ratio 1.0  


 


Anion Gap


  7.0 mmol/L


(3-11) 


 


 


Est Creatinine Clear Calc


Drug Dose 38.1 ml/min 


  


 


 


Estimated GFR (


American) 40.5 


  


 


 


Estimated GFR (Non-


American 34.9 


  


 


 


BUN/Creatinine Ratio 14.3 (10-20)  


 


Calcium Level


  9.2 mg/dl


(8.5-10.1) 


 


 


Total Bilirubin


  0.9 mg/dl


(0.2-1) 


 


 


Aspartate Amino Transf


(AST/SGOT) 43 U/L (15-37) 


  


 


 


Alanine Aminotransferase


(ALT/SGPT) 47 U/L (12-78) 


  


 


 


Alkaline Phosphatase


  71 U/L


() 


 


 


Troponin I


  0.047 ng/ml


(0-0.045) 


 


 


Pro-B-Type Natriuretic Peptide


  2068 pg/ml


(0-1800) 


 


 


Total Protein


  7.4 gm/dl


(6.4-8.2) 


 


 


Albumin


  3.4 gm/dl


(3.4-5.0) 


 


 


Globulin


  4.0 gm/dl


(2.5-4.0) 


 


 


Albumin/Globulin Ratio 0.9 (0.9-2)  


 


Beta-Hydroxybutyric Acid


  5.50 mg/dL


(0.2-2.81) 


 


 


Urine Color  YELLOW 


 


Urine Appearance  CLEAR (CLEAR) 


 


Urine pH  7.0 (4.5-7.5) 


 


Urine Specific Gravity


  


  1.016


(1.000-1.030)


 


Urine Protein  1+ (NEG) 


 


Urine Glucose (UA)  NEG (NEG) 


 


Urine Ketones  NEG (NEG) 


 


Urine Occult Blood  NEG (NEG) 


 


Urine Nitrite  NEG (NEG) 


 


Urine Bilirubin  NEG (NEG) 


 


Urine Urobilinogen  NEG (NEG) 


 


Urine Leukocyte Esterase  NEG (NEG) 


 


Urine WBC (Auto)  1-5 /hpf (0-5) 


 


Urine RBC (Auto)  0-4 /hpf (0-4) 


 


Urine Hyaline Casts (Auto)


  


  5-10 /lpf


(0-5)


 


Urine Epithelial Cells (Auto)  >30 /lpf (0-5) 


 


Urine Bacteria (Auto)  NEG (NEG) 








Laboratory results reviewed by me.





Medications Administered











 Medications


  (Trade)  Dose


 Ordered  Sig/Gaby


 Route  Start Time


 Stop Time Status Last Admin


Dose Admin


 


 Nitroglycerin


  (Nitroglycerin


 2% Oint)  1 inch  NOW  STAT


 EXT  10/5/17 08:26


 10/5/17 08:30 DC 10/5/17 08:36


1 INCH


 


 Furosemide


  (Lasix Inj)  40 mg  NOW  STAT


 IV  10/5/17 08:26


 10/5/17 08:30 DC 10/5/17 08:35


40 MG











ECG


Indication:  SOB/dyspnea


Rate (beats per minute):  67


Rhythm:  normal sinus


Findings:  no acute ischemic change, no ectopy, other (old anterior septal 

infarct)





ED Course


0822: The patient was evaluated in room B11B. A complete history and physical 

exam was performed.





0826: Ordered Lasix Inj 40 mg IV, Nitroglycerin 1 inch EXT.





0935: I reevaluated the patient and she is resting comfortably.  I discussed 

the exam findings with her and I discussed the treatment plan.  She verbalized 

complete understanding and agreement.  She will be evaluated for further 

treatment.





0938: I discussed the patients case with Dr. Gleason [facility].  He is going to 

evaluate the patient for further treatment.





Medical Decision


The patient is an 82 year old female who presents to the ED with complaints of 

shortness of breath.  Differential diagnoses considered include CHF, COPD 

exacerbation, pneumonia, bronchitis, anemia, MI, generalized fluid overload.





There is no leukocytosis or concerning anemia.  Renal panel testing shows a 

moderately elevated blood sugar, no renal failure.  No hepatitis.  No 

concerning coagulopathy.  EKG shows a normal sinus rhythm, no acute ischemia.  

There was a mild elevation to the cardiac troponin which could indicate some 

cardiac strain.  Chest x-ray shows heart failure, BNP is markedly elevated 

consistent with fluid overload.  Urinalysis does not show infection.





The patient presents with increasing shortness of breath.  She has heart 

failure by exam and by history.  She was given nitro paste, she received IV 

Lasix, she has diuresed.  I spoke with case management, I talked with the 

patient.  The on-call hospitalist was consulted.  Admission/observation is 

advised.





Medication Reconcilliation


Current Medication List:  was personally reviewed by me





Blood Pressure Screening


Patient's blood pressure:  Elevated blood pressure


Blood pressure disposition:  Referred to PCP





Consults


Time Called:  0935


Consulting Physician:  JOSÉ MIGUEL Mullins


Returned Call:  [phone]


I discussed the patients case with JOSÉ MIGUEL Mullins.  He is going to evaluate 

the patient for further treatment.





Impression





 Primary Impression:  


 Shortness of breath


 Additional Impression:  


 CHF (congestive heart failure)





Scribe Attestation


The scribe's documentation has been prepared under my direction and personally 

reviewed by me in its entirety. I confirm that the note above accurately 

reflects all work, treatment, procedures, and medical decision making performed 

by me.





Departure Information


Dispostion


Being Evaluated By Hospitalist





Referrals


Sharan Olsen PA-C (PCP)





Problem Qualifiers

## 2017-10-06 VITALS
SYSTOLIC BLOOD PRESSURE: 146 MMHG | DIASTOLIC BLOOD PRESSURE: 83 MMHG | OXYGEN SATURATION: 97 % | HEART RATE: 54 BPM | TEMPERATURE: 98.06 F

## 2017-10-06 VITALS
HEART RATE: 48 BPM | OXYGEN SATURATION: 100 % | TEMPERATURE: 97.34 F | SYSTOLIC BLOOD PRESSURE: 137 MMHG | DIASTOLIC BLOOD PRESSURE: 73 MMHG

## 2017-10-06 VITALS
OXYGEN SATURATION: 97 % | DIASTOLIC BLOOD PRESSURE: 89 MMHG | SYSTOLIC BLOOD PRESSURE: 156 MMHG | HEART RATE: 53 BPM | TEMPERATURE: 97.34 F

## 2017-10-06 VITALS
HEART RATE: 49 BPM | TEMPERATURE: 98.24 F | OXYGEN SATURATION: 98 % | DIASTOLIC BLOOD PRESSURE: 97 MMHG | SYSTOLIC BLOOD PRESSURE: 145 MMHG

## 2017-10-06 VITALS
HEART RATE: 42 BPM | SYSTOLIC BLOOD PRESSURE: 134 MMHG | OXYGEN SATURATION: 96 % | DIASTOLIC BLOOD PRESSURE: 73 MMHG | TEMPERATURE: 97.88 F

## 2017-10-06 VITALS
HEART RATE: 45 BPM | DIASTOLIC BLOOD PRESSURE: 82 MMHG | TEMPERATURE: 97.7 F | OXYGEN SATURATION: 100 % | SYSTOLIC BLOOD PRESSURE: 163 MMHG

## 2017-10-06 VITALS — OXYGEN SATURATION: 96 %

## 2017-10-06 VITALS — HEART RATE: 60 BPM

## 2017-10-06 LAB
ANION GAP SERPL CALC-SCNC: 6 MMOL/L (ref 3–11)
BUN SERPL-MCNC: 26 MG/DL (ref 7–18)
BUN/CREAT SERPL: 18.7 (ref 10–20)
CALCIUM SERPL-MCNC: 8.6 MG/DL (ref 8.5–10.1)
CHLORIDE SERPL-SCNC: 101 MMOL/L (ref 98–107)
CHOLEST/HDLC SERPL: 1.7 {RATIO}
CO2 SERPL-SCNC: 32 MMOL/L (ref 21–32)
CREAT CL PREDICTED SERPL C-G-VRATE: 37 ML/MIN
CREAT SERPL-MCNC: 1.4 MG/DL (ref 0.6–1.2)
EST. AVERAGE GLUCOSE BLD GHB EST-MCNC: 166 MG/DL
GLUCOSE SERPL-MCNC: 113 MG/DL (ref 70–99)
GLUCOSE UR QL: 54 MG/DL
HA1C FLAG: 25
KETONES UR QL STRIP: 22 MG/DL
MAGNESIUM SERPL-MCNC: 2.4 MG/DL (ref 1.8–2.4)
NITRITE UR QL STRIP: 69 MG/DL (ref 0–150)
PH UR: 90 MG/DL (ref 0–200)
POTASSIUM SERPL-SCNC: 3.8 MMOL/L (ref 3.5–5.1)
SODIUM SERPL-SCNC: 139 MMOL/L (ref 136–145)
TSH SERPL-ACNC: 42.6 UIU/ML (ref 0.3–4.5)
VERY LOW DENSITY LIPOPROT CALC: 14 MG/DL

## 2017-10-06 RX ADMIN — INSULIN ASPART SCH UNITS: 100 INJECTION, SOLUTION INTRAVENOUS; SUBCUTANEOUS at 17:19

## 2017-10-06 RX ADMIN — INSULIN GLARGINE SCH UNITS: 100 INJECTION, SOLUTION SUBCUTANEOUS at 08:40

## 2017-10-06 RX ADMIN — Medication SCH MG: at 20:19

## 2017-10-06 RX ADMIN — INSULIN GLARGINE SCH UNITS: 100 INJECTION, SOLUTION SUBCUTANEOUS at 21:14

## 2017-10-06 RX ADMIN — INSULIN ASPART SCH UNITS: 100 INJECTION, SOLUTION INTRAVENOUS; SUBCUTANEOUS at 21:00

## 2017-10-06 RX ADMIN — Medication SCH MG: at 08:42

## 2017-10-06 RX ADMIN — LEVOTHYROXINE SODIUM SCH MCG: 50 TABLET ORAL at 05:50

## 2017-10-06 RX ADMIN — CARVEDILOL SCH MG: 12.5 TABLET, FILM COATED ORAL at 07:35

## 2017-10-06 RX ADMIN — FERROUS SULFATE TAB EC 325 MG (65 MG FE EQUIVALENT) SCH MG: 325 (65 FE) TABLET DELAYED RESPONSE at 07:35

## 2017-10-06 RX ADMIN — INSULIN ASPART SCH UNITS: 100 INJECTION, SOLUTION INTRAVENOUS; SUBCUTANEOUS at 12:14

## 2017-10-06 RX ADMIN — HEPARIN SODIUM SCH UNIT: 10000 INJECTION, SOLUTION INTRAVENOUS; SUBCUTANEOUS at 08:41

## 2017-10-06 RX ADMIN — INSULIN ASPART SCH UNITS: 100 INJECTION, SOLUTION INTRAVENOUS; SUBCUTANEOUS at 08:39

## 2017-10-06 RX ADMIN — CARVEDILOL SCH MG: 12.5 TABLET, FILM COATED ORAL at 20:19

## 2017-10-06 RX ADMIN — AMIODARONE HYDROCHLORIDE SCH MG: 200 TABLET ORAL at 07:36

## 2017-10-06 RX ADMIN — HEPARIN SODIUM SCH UNIT: 10000 INJECTION, SOLUTION INTRAVENOUS; SUBCUTANEOUS at 21:15

## 2017-10-06 RX ADMIN — LOSARTAN POTASSIUM SCH MG: 50 TABLET, FILM COATED ORAL at 07:36

## 2017-10-06 RX ADMIN — ATORVASTATIN CALCIUM SCH MG: 10 TABLET, FILM COATED ORAL at 20:18

## 2017-10-06 RX ADMIN — POTASSIUM CITRATE SCH MEQ: 10 TABLET ORAL at 07:37

## 2017-10-06 NOTE — CLINICAL DOCUMENTATION QUERY
**** CLINICAL DOCUMENTATION QUERY****



Dr. FINN, 



In your clinical opinion is this patient being managed for:

    

                        (  ) NSTEMI due to demand ischemia, POA

                        (  ) Not Agree



                        (  ) Other explanation of clinical findings (Please Explain)

                        (  ) Unable to determine (Please Define)

                        (  ) Need to Discuss

                        



The medical record reflects the following clinical findings, treatment, and risk factors.  
  



Clinical Indicators: 81 yo female presenting with increasing dyspnea x 2 days.  Diagnosed 
with acute diastolic CHF. Trops 0.047/0.341/0.294, hypoxic upon arrival in ER with RA sat 
of 82%. EMS administered duoneb as well as nitro spray. Repeat EKG with T wave inversion

Treatment: O2 support, IV lasix, NTG paste, continue coreg, cozaar, tele, 

Risk Factors: age, Acute diastolic CHF, hypoxia, cardiomyopathy, A fib, HTN



Please clarify and document your clinical opinion in the progress notes and discharge 
summary. Terms such as "probable", "suspected", "likely", "questionable", "possible", or 
"still to be ruled out" are acceptable. 



*****IF IN AGREEMENT, YOU MUST DOCUMENT ABOVE DIAGNOSTIC STATEMENT IN DAILY PROGRESS NOTES 
AND DISCHARGE SUMMARY. This document is not part of the patient's record.*****

Thank You, Candice Atkins, [phone]

## 2017-10-07 VITALS
HEART RATE: 45 BPM | SYSTOLIC BLOOD PRESSURE: 179 MMHG | TEMPERATURE: 98.24 F | OXYGEN SATURATION: 96 % | DIASTOLIC BLOOD PRESSURE: 81 MMHG

## 2017-10-07 VITALS
TEMPERATURE: 98.06 F | OXYGEN SATURATION: 92 % | HEART RATE: 62 BPM | SYSTOLIC BLOOD PRESSURE: 107 MMHG | DIASTOLIC BLOOD PRESSURE: 59 MMHG

## 2017-10-07 VITALS
HEART RATE: 43 BPM | SYSTOLIC BLOOD PRESSURE: 136 MMHG | DIASTOLIC BLOOD PRESSURE: 70 MMHG | TEMPERATURE: 97.88 F | OXYGEN SATURATION: 100 %

## 2017-10-07 VITALS
SYSTOLIC BLOOD PRESSURE: 107 MMHG | TEMPERATURE: 98.06 F | DIASTOLIC BLOOD PRESSURE: 59 MMHG | HEART RATE: 62 BPM | OXYGEN SATURATION: 92 %

## 2017-10-07 VITALS — DIASTOLIC BLOOD PRESSURE: 66 MMHG | HEART RATE: 58 BPM | SYSTOLIC BLOOD PRESSURE: 126 MMHG | OXYGEN SATURATION: 92 %

## 2017-10-07 LAB
ANION GAP SERPL CALC-SCNC: 5 MMOL/L (ref 3–11)
BUN SERPL-MCNC: 28 MG/DL (ref 7–18)
BUN/CREAT SERPL: 17.7 (ref 10–20)
CALCIUM SERPL-MCNC: 8.6 MG/DL (ref 8.5–10.1)
CHLORIDE SERPL-SCNC: 102 MMOL/L (ref 98–107)
CO2 SERPL-SCNC: 34 MMOL/L (ref 21–32)
CREAT CL PREDICTED SERPL C-G-VRATE: 32.5 ML/MIN
CREAT SERPL-MCNC: 1.6 MG/DL (ref 0.6–1.2)
GLUCOSE SERPL-MCNC: 78 MG/DL (ref 70–99)
POTASSIUM SERPL-SCNC: 4 MMOL/L (ref 3.5–5.1)
SODIUM SERPL-SCNC: 141 MMOL/L (ref 136–145)

## 2017-10-07 RX ADMIN — Medication SCH MG: at 07:27

## 2017-10-07 RX ADMIN — CARVEDILOL SCH MG: 12.5 TABLET, FILM COATED ORAL at 07:27

## 2017-10-07 RX ADMIN — HEPARIN SODIUM SCH UNIT: 10000 INJECTION, SOLUTION INTRAVENOUS; SUBCUTANEOUS at 08:05

## 2017-10-07 RX ADMIN — FERROUS SULFATE TAB EC 325 MG (65 MG FE EQUIVALENT) SCH MG: 325 (65 FE) TABLET DELAYED RESPONSE at 07:27

## 2017-10-07 RX ADMIN — LOSARTAN POTASSIUM SCH MG: 50 TABLET, FILM COATED ORAL at 07:26

## 2017-10-07 RX ADMIN — LEVOTHYROXINE SODIUM SCH MCG: 50 TABLET ORAL at 06:04

## 2017-10-07 RX ADMIN — INSULIN ASPART SCH UNITS: 100 INJECTION, SOLUTION INTRAVENOUS; SUBCUTANEOUS at 11:38

## 2017-10-07 RX ADMIN — INSULIN ASPART SCH UNITS: 100 INJECTION, SOLUTION INTRAVENOUS; SUBCUTANEOUS at 07:33

## 2017-10-07 RX ADMIN — POTASSIUM CITRATE SCH MEQ: 10 TABLET ORAL at 07:26

## 2017-10-07 RX ADMIN — INSULIN GLARGINE SCH UNITS: 100 INJECTION, SOLUTION SUBCUTANEOUS at 08:39

## 2017-10-07 NOTE — DISCHARGE INSTRUCTIONS
Discharge Instructions


Date of Service


Oct 7, 2017.





Admission


Reason for Admission:  Acute Exacerbation Of Chf





Discharge


Discharge Diagnosis / Problem:  acute diastolic heart failure





Discharge Goals


Goal(s):  Diagnostic testing, Therapeutic intervention





Activity Recommendations


Activity Limitations:  resume your previous activity





Please take you medicines as written and if any questions call your primary 

care doctor for instruction





As written please weigh yourself daily and if weight goes up call your primary 

care doctor


.





Instructions / Follow-Up


Instructions / Follow-Up





Call your Primary Care doctor if any of the following symptoms or problems 

start or get worse:





* Shortness of breath or difficulty breathing


* Wake up at night short of breath


* Chest pain


* Cough


* Swelling of your hands, feet, or legs


* More fatigued or tired with your normal activity


* Palpitations - sudden fast heart beats





WEIGHT





* Weigh yourself every morning after using the bathroom.


* Use the same scale.


* Wear the same amount of clothing.


* Write your weight down on a chart.


* Call your Primary Care doctor if you gain more than 2-3 pounds in 1-2 days.





MEDICATIONS





* Use this discharge instruction sheet for medication instructions.


* Take your medications at the time your doctor ordered.


* Do not skip a dose of your medicines.


* If you miss a dose of medicine, take it as soon as possible, but DO NOT 

DOUBLE A DOSE.


* Read your medicine information when you get home.


* Know all of the side effects of your medicine.  If in doubt, ask your 

pharmacist


* Call your Primary Care doctor's office if you have any side effects.


* Be sure all of your doctors know what medicine and herbs you take (including 

cold, flu, and herbal medicine).








Take the following with you to your follow-up doctor appointments:





* Weight Chart


* Medication List


* List of questions





Do not drink excessive alcohol, beer or wine.





Current Hospital Diet


Patient's current hospital diet: AHA Diet (Heart Healthy), Diabetes Type 2 Diet





Discharge Diet


Recommended Diet:  Low Sodium Diet (2gm Na), Diabetes Type 2 Diet





Pending Studies


Studies pending at discharge:  no





Laboratory Results





Hemoglobin A1c








Test


  10/6/17


05:49 Range/Units


 


 


Estimated Average Glucose 166   mg/dl


 


Hemoglobin A1c 7.4 H 4.5-5.6  %








Lipid Panel








Test


  10/6/17


05:49 Range/Units


 


 


Triglycerides Level 69  0-150  mg/dl


 


Cholesterol Level 90  0-200  mg/dl


 


HDL Cholesterol 54   mg/dl


 


Cholesterol/HDL Ratio 1.7   


 


LDL Cholesterol, Calculated 22   mg/dl











Medical Emergencies








.


Who to Call and When:





Call 911 or go to the Emergency Room if:





* If at any time you feel your situation is an emergency


* You have tightness or pain in your chest that does not go away with rest or 

Nitroglycerin


* You are very short of breath even with rest





.





Non-Emergent Contact


Non-Emergency issues call your:  Primary Care Provider


Call Non-Emergent contact if:  temperature is above 101, your pain is unusual 

for you





.


.








"Provider Documentation" section prepared by Tyler Cavazos.








.





VTE Core Measure


Inpt VTE Proph given/why not?:  Unfractionated heparin STEPHAN, T.E.SILVIO. Stockings, SCD

's

## 2017-10-07 NOTE — DISCHARGE SUMMARY
Discharge Summary


Date of Service


Oct 7, 2017.





Discharge Summary


Admission Date:


Oct 5, 2017 at 10:18


Discharge Date:  Oct 7, 2017


Discharge Disposition:  Home with services


Principal Diagnosis:  acute on chronic diastolic heart failure


Immunizations:  


   Have You Had Influenza Vaccine:  Unknown


   History of Tetanus Vaccine?:  Unknown


   History of Pneumococcal:  Unknown


   History of Hepatitis B Vaccine:  Unknown





Medication Reconciliation


Changed Medications:  


Amiodarone Hcl (Cordarone) 200 Mg Tab


200 MG PO DAILY, #60 TAB 6 Refills (Changed from: BID; Refills: )





 


Continued Medications:  


Ascorbic Acid (Vitamin C 500 mg) 1 Chw Chw


500 MG PO DAILY





Atorvastatin (Lipitor) 10 Mg Tab


10 MG PO HS, TAB





Carvedilol (Coreg) 6.25 Mg Tab


12.25 MG PO BID, TAB





Ferrous Sulfate (Ferrous Sulfate) 325 Mg Tab


325 MG PO DAILY





Fluticasone Furoate-Vilanterol (Breo Ellipta) 1 Inh Inh


1 PUFF PO DAILY





Furosemide (Lasix) 40 Mg Tab


40 MG PO DAILY, TAB


CAN TAKE AN ADDITIONAL TABLE AS NEEDED FOR WEIGHT GAIN, EDEMA, SOB


Glipizide (Glipizide Er) 10 Mg Tab


10 MG PO BID for 90 Days, #180 TAB 3 Refills





Levalbuterol Tartrate (Levalbuterol Tartrate Hfa) 45 Mcg/Act Aer


2 PUFFS INH Q4 PRN for Shortness of Breath





Levothyroxine Sodium (Synthroid) 50 Mcg Tab


50 MCG PO DAILY, TAB





Losartan Potassium (Cozaar) 100 Mg Tab


100 MG PO DAILY, TAB





Magnesium Oxide (Magnesium) 500 Mg Cap


500 MG PO BID











Discharge Exam


Review of Systems:  


   Constitutional:  No fever, No chills


   Respiratory:  No cough, No sputum, No wheezing


   Abdomen:  No pain, No nausea, No vomiting


Physical Exam:  


   General Appearance:  WD/WN, + mild distress


   Eyes:  PERRL, EOMI


   Respiratory/Chest:  chest non-tender, lungs clear, normal breath sounds


   Cardiovascular:  regular rate, rhythm, no murmur


   Abdomen / GI:  normal bowel sounds, non tender, soft


   Extremities:  no pedal edema, normal range of motion





Hospital Course


83 yo F with acute on chronic diastolic heart failure,  COPD, atrial 

fibrillation s/p cardioversion twice while on amiodarone, HTN,  anemia.





Acute on chronic CHF exacerbation had missed home doses, mildly elevated 

troponin reinforced good home compliance with meds and body weights


- Last echo completed at end of June 2017 showing preserved EF 55-60%.  

followed by Dr. Spain in the HF clinic as outpatient


 carvedilol 12.5 BID, losartan 100 mg daily





Atrial Fibrillation continue amiodarone 200 mg daily, carvedilol 12.5 mg BID, 

given bradycardia will reduce dose to 200 mg a day





COPD Breo inh and xopenex





DM II uncontrolled on admission, using ssi and one dose of basal lantus, hold 

glipizide 10 mg and metformin 850 mg BID for now with ckd





CKD stage III





Hypothyroidism tsh is markedly elevated either underdosed or not taking 

medication





DVT ppx: heparin subq, teds, scds





CODE STATUS: FULL CODE


Total Time Spent:  Greater than 30 minutes


This includes examination of the patient, discharge planning, medication 

reconciliation, and communication with other providers.





Discharge Instructions


Please refer to the electronic Patient Visit Report (Discharge Instructions) 

for additional information.





Additional Copies To


Felix Farr MD

## 2017-10-18 ENCOUNTER — HOSPITAL ENCOUNTER (OUTPATIENT)
Dept: HOSPITAL 45 - C.CTS | Age: 82
Discharge: HOME | End: 2017-10-18
Attending: INTERNAL MEDICINE
Payer: COMMERCIAL

## 2017-10-18 DIAGNOSIS — R06.09: Primary | ICD-10-CM

## 2017-10-18 DIAGNOSIS — K76.89: ICD-10-CM

## 2017-10-18 NOTE — DIAGNOSTIC IMAGING REPORT
(CHEST) THORAX WITHOUT



CLINICAL HISTORY: R06.09 HORN (dyspnea on exertion)



COMPARISON STUDY:  Chest x-ray dated 10/5/2017 



CT DOSE: 658.56 mGy.cm



TECHNIQUE:  CT of the thorax was performed from the thoracic inlet to the lung

bases. Images are reviewed in the axial, sagittal, and coronal planes. IV

contrast was not administered for this examination.  A dose lowering technique

was utilized adhering to the principles of ALARA.





FINDINGS:



Thyroid: Imaged portions of the thyroid gland are normal in appearance.



Thoracic aorta: The thoracic aorta is normal in course and caliber, noting

standard 3 vessel arch anatomy.



Heart: There are minor coronary artery calcifications. There is no significant

pericardial fluid.



Lungs and pleural spaces: There is a small right pleural effusion, and trace

left pleural effusion. There is no lobar consolidation. There is subtle

increased groundglass attenuation the lungs. There is septal thickening.



Mediastinum: Mediastinal lymph nodes are the upper limits of normal in size



Kathy: There is no evidence of pathologic hilar adenopathy given the limitations

of a noncontrast study



Axilla: Clear.



Upper abdomen:  The liver is of increased density. This could be a manifestation

of amiodarone therapy.



Skeletal structures: There are no lytic or blastic osseous lesions.



IMPRESSION:  

1. Subtle groundglass attenuation the lungs, interlobular septal edema, small

right pleural effusion and trace left pleural effusion. The findings are

suggestive of cardiogenic edema.

2. Increased density of the liver. This could be a manifestation of amiodarone

therapy. Clinical correlation in regards to amiodarone toxicity is recommended.







Electronically signed by:  Darryl Valenzuela M.D.

10/18/2017 7:43 AM



Dictated Date/Time:  10/18/2017 7:28 AM

## 2017-12-06 ENCOUNTER — HOSPITAL ENCOUNTER (EMERGENCY)
Dept: HOSPITAL 45 - C.EDB | Age: 82
Discharge: HOME | End: 2017-12-06
Payer: COMMERCIAL

## 2017-12-06 VITALS
BODY MASS INDEX: 32.04 KG/M2 | HEIGHT: 67.99 IN | WEIGHT: 211.42 LBS | WEIGHT: 211.42 LBS | BODY MASS INDEX: 32.04 KG/M2 | HEIGHT: 67.99 IN

## 2017-12-06 VITALS — DIASTOLIC BLOOD PRESSURE: 98 MMHG | HEART RATE: 77 BPM | SYSTOLIC BLOOD PRESSURE: 184 MMHG | OXYGEN SATURATION: 94 %

## 2017-12-06 VITALS — TEMPERATURE: 98.24 F

## 2017-12-06 VITALS — OXYGEN SATURATION: 93 %

## 2017-12-06 DIAGNOSIS — M54.5: Primary | ICD-10-CM

## 2017-12-06 DIAGNOSIS — J45.909: ICD-10-CM

## 2017-12-06 DIAGNOSIS — I10: ICD-10-CM

## 2017-12-06 LAB
ALBUMIN/GLOB SERPL: 0.7 {RATIO} (ref 0.9–2)
ALP SERPL-CCNC: 79 U/L (ref 45–117)
ALT SERPL-CCNC: 37 U/L (ref 12–78)
ANION GAP SERPL CALC-SCNC: 7 MMOL/L (ref 3–11)
AST SERPL-CCNC: 26 U/L (ref 15–37)
BASOPHILS # BLD: 0.01 K/UL (ref 0–0.2)
BASOPHILS NFR BLD: 0.1 %
BUN SERPL-MCNC: 20 MG/DL (ref 7–18)
BUN/CREAT SERPL: 16.6 (ref 10–20)
CALCIUM SERPL-MCNC: 9.1 MG/DL (ref 8.5–10.1)
CHLORIDE SERPL-SCNC: 101 MMOL/L (ref 98–107)
CO2 SERPL-SCNC: 29 MMOL/L (ref 21–32)
COMPLETE: YES
CREAT CL PREDICTED SERPL C-G-VRATE: 44.1 ML/MIN
CREAT SERPL-MCNC: 1.19 MG/DL (ref 0.6–1.2)
EOSINOPHIL NFR BLD AUTO: 157 K/UL (ref 130–400)
GLOBULIN SER-MCNC: 4.5 GM/DL (ref 2.5–4)
GLUCOSE SERPL-MCNC: 218 MG/DL (ref 70–99)
HCT VFR BLD CALC: 40.9 % (ref 37–47)
IG%: 0.2 %
IMM GRANULOCYTES NFR BLD AUTO: 8.3 %
LYMPHOCYTES # BLD: 0.73 K/UL (ref 1.2–3.4)
MCH RBC QN AUTO: 29.3 PG (ref 25–34)
MCHC RBC AUTO-ENTMCNC: 32.3 G/DL (ref 32–36)
MCV RBC AUTO: 90.9 FL (ref 80–100)
MONOCYTES NFR BLD: 10.3 %
NEUTROPHILS # BLD AUTO: 0.3 %
NEUTROPHILS NFR BLD AUTO: 80.8 %
PMV BLD AUTO: 10.7 FL (ref 7.4–10.4)
POTASSIUM SERPL-SCNC: 3.9 MMOL/L (ref 3.5–5.1)
RBC # BLD AUTO: 4.5 M/UL (ref 4.2–5.4)
SODIUM SERPL-SCNC: 137 MMOL/L (ref 136–145)
WBC # BLD AUTO: 8.76 K/UL (ref 4.8–10.8)

## 2017-12-06 NOTE — DIAGNOSTIC IMAGING REPORT
CT LUMBAR SPINE WITHOUT



CT DOSE: 1327.66 mGy.cm



CLINICAL HISTORY: Progressive low back pain.    



TECHNIQUE: Helical images were acquired in transverse plane. Reformatted

sagittal and coronal images were reviewed.  A dose lowering technique was

utilized adhering to the principles of ALARA.





CONTRAST: No contrast was administered



COMPARISON STUDY: None.



FINDINGS: 



Images to the lung bases reveal nonspecific bibasal airspace opacities





L1-2 level: There is no evidence of significant disc bulge or focal herniation.

There is no evidence of spinal or foraminal stenosis.

L2-3 level: There is a mild circumferential disc bulge. There is no significant

spinal or foraminal stenosis

L3-4 level: There is a circumferential disc bulge. There is ligamentous

hypertrophy. There is moderate spinal stenosis.

L4-5 level: There is a circumferential disc bulge present. There is ligamentous

hypertrophy. There is severe spinal stenosis.

L5-S1 level: There is a mild circumferential disc bulge. There is no significant

spinal or foraminal stenosis.



No fractures, traumatic subluxations, or destructive lesions are visualized.



IMPRESSION: 

1. No fractures, traumatic subluxations, or destructive lesions are visualized

2. Degenerative changes with moderate spinal stenosis at the L3-4 level, and

severe spinal stenosis at the L4-5 level. 







Electronically signed by:  Darryl Valenzuela M.D.

12/6/2017 7:53 AM



Dictated Date/Time:  12/6/2017 7:49 AM

## 2017-12-06 NOTE — DIAGNOSTIC IMAGING REPORT
CHEST ONE VIEW PORTABLE



CLINICAL HISTORY: sob    



COMPARISON STUDY:  10/5/2017



FINDINGS: The heart is borderline enlarged. There have been resolution of the

previously described interstitial edema. There are linear by basilar opacities,

likely atelectatic. There is no lobar consolidation.[ There are no significant

pleural effusions.



IMPRESSION: Subsegmental atelectatic changes at the lung bases. No evidence of

lobar consolidation







Electronically signed by:  Darryl Valenzuela M.D.

12/6/2017 6:52 AM



Dictated Date/Time:  12/6/2017 6:51 AM

## 2017-12-09 ENCOUNTER — HOSPITAL ENCOUNTER (EMERGENCY)
Dept: HOSPITAL 45 - C.EDA | Age: 82
Discharge: HOME | End: 2017-12-09
Payer: COMMERCIAL

## 2017-12-09 VITALS — OXYGEN SATURATION: 98 % | DIASTOLIC BLOOD PRESSURE: 89 MMHG | HEART RATE: 68 BPM | SYSTOLIC BLOOD PRESSURE: 188 MMHG

## 2017-12-09 VITALS
HEIGHT: 67.99 IN | BODY MASS INDEX: 31.94 KG/M2 | HEIGHT: 67.99 IN | WEIGHT: 210.76 LBS | BODY MASS INDEX: 31.94 KG/M2 | WEIGHT: 210.76 LBS

## 2017-12-09 VITALS — TEMPERATURE: 97.88 F

## 2017-12-09 DIAGNOSIS — E78.5: ICD-10-CM

## 2017-12-09 DIAGNOSIS — Z79.899: ICD-10-CM

## 2017-12-09 DIAGNOSIS — M54.9: Primary | ICD-10-CM

## 2017-12-09 DIAGNOSIS — Z87.891: ICD-10-CM

## 2017-12-09 DIAGNOSIS — I50.22: ICD-10-CM

## 2017-12-09 DIAGNOSIS — E11.9: ICD-10-CM

## 2017-12-09 DIAGNOSIS — I11.0: ICD-10-CM

## 2017-12-09 DIAGNOSIS — J45.909: ICD-10-CM

## 2017-12-09 LAB
ALP SERPL-CCNC: 77 U/L (ref 45–117)
ALT SERPL-CCNC: 46 U/L (ref 12–78)
ANION GAP SERPL CALC-SCNC: 7 MMOL/L (ref 3–11)
AST SERPL-CCNC: 31 U/L (ref 15–37)
BASOPHILS # BLD: 0.01 K/UL (ref 0–0.2)
BASOPHILS NFR BLD: 0.2 %
BUN SERPL-MCNC: 40 MG/DL (ref 7–18)
BUN/CREAT SERPL: 29.1 (ref 10–20)
CALCIUM SERPL-MCNC: 9.6 MG/DL (ref 8.5–10.1)
CHLORIDE SERPL-SCNC: 96 MMOL/L (ref 98–107)
CO2 SERPL-SCNC: 31 MMOL/L (ref 21–32)
COMPLETE: YES
CREAT CL PREDICTED SERPL C-G-VRATE: 37.7 ML/MIN
CREAT SERPL-MCNC: 1.39 MG/DL (ref 0.6–1.2)
EOSINOPHIL NFR BLD AUTO: 195 K/UL (ref 130–400)
GLUCOSE SERPL-MCNC: 230 MG/DL (ref 70–99)
HCT VFR BLD CALC: 41.4 % (ref 37–47)
IG%: 0.3 %
IMM GRANULOCYTES NFR BLD AUTO: 11.7 %
LYMPHOCYTES # BLD: 0.77 K/UL (ref 1.2–3.4)
MCH RBC QN AUTO: 29.5 PG (ref 25–34)
MCHC RBC AUTO-ENTMCNC: 32.6 G/DL (ref 32–36)
MCV RBC AUTO: 90.4 FL (ref 80–100)
MONOCYTES NFR BLD: 11.2 %
NEUTROPHILS # BLD AUTO: 0.8 %
NEUTROPHILS NFR BLD AUTO: 75.8 %
PMV BLD AUTO: 10.3 FL (ref 7.4–10.4)
POTASSIUM SERPL-SCNC: 4 MMOL/L (ref 3.5–5.1)
RBC # BLD AUTO: 4.58 M/UL (ref 4.2–5.4)
SODIUM SERPL-SCNC: 134 MMOL/L (ref 136–145)
WBC # BLD AUTO: 6.6 K/UL (ref 4.8–10.8)

## 2017-12-09 NOTE — EMERGENCY ROOM VISIT NOTE
History


Report prepared by Gume:  nIa Atkinson


Under the Supervision of:  Dr. Sky Wright D.O.


First contact with patient:  08:18


Chief Complaint:  BACK PAIN


Stated Complaint:  BACK PAIN





History of Present Illness


The patient is a 82 year old female who presents to the Emergency Room with 

complaints of constant back pain beginning 3 days ago. The patient states that 

she believes she twisted her back when she was putting a gallon of water on a 

counter. The patient reports that moving exacerbates the pain. She states that 

she has been on pain medications since the pain began but that she finished it, 

and that she has still had the pain even on the medicine. She reports that she 

has been taking Percocet every 4 hours but that that does not help. She also 

reports having abdominal pain., but denies having urinary symptoms. The patient 

states that she had a CT scan and an X-Ray done 3 days ago.





   Source of History:  patient


   Onset:  3 days ago


   Position:  back


   Timing:  constant


   Modifying Factors (Worsening):  movement


   Associated Symptoms:  + abdominal pain, No urinary symptoms





Review of Systems


See HPI for pertinent positives & negatives. A total of 10 systems reviewed and 

were otherwise negative.





Past Medical & Surgical


Medical Problems:


(1) Acute exacerbation of CHF (congestive heart failure)


(2) Afib


(3) Asthma


(4) Congestive heart failure


(5) History of high blood pressure


(6) HTN (hypertension)


(7) Hyperlipidemia


(8) Systolic CHF, acute


(9) T2DM (type 2 diabetes mellitus)








Family History





Patient reports no known family medical history.





Social History


Smoking Status:  Former Smoker


Alcohol Use:  none


Drug Use:  none


Marital Status:  


Occupation Status:  retired





Current/Historical Medications


Scheduled


Ascorbic Acid (Ascorbic Acid), 500 MG PO DAILY


Atorvastatin (Lipitor), 10 MG PO HS


Carvedilol (Coreg), 6.25 MG PO BID


Docusate Sodium (Colace), 1 CAP PO BID


Ferrous Sulfate (Ferrous Sulfate), 325 MG PO DAILY


Fluticasone Furoate-Vilanterol (Breo Ellipta), 1 PUFF PO DAILY


Furosemide (Lasix), 80 MG PO BID


Glipizide (Glipizide Er), 10 MG PO BID


Levothyroxine Sodium (Levothyroxine Sodium), 75 MCG PO DAILY


Lidocaine (Lidoderm Patch 5%), 1 PATCH TD DAILY


Losartan Potassium (Cozaar), 100 MG PO DAILY


Magnesium Oxide (Magnesium), 500 MG PO BID


Sennosides (Senokot), 8.6 MG PO HS





Scheduled PRN


Oxycodone/Acetaminophen 5MG/325MG (Percocet 5MG/325MG), 1-2 TABLETS PO Q6 PRN 

for Pain





Allergies


Coded Allergies:  


     Oxytetracycline (Verified  Allergy, Unknown, RASH, 12/6/17)


     Polymyxin B (Verified  Allergy, Unknown, RASH, 12/6/17)





Physical Exam


Vital Signs











  Date Time  Temp Pulse Resp B/P (MAP) Pulse Ox O2 Delivery O2 Flow Rate FiO2


 


12/9/17 13:05  64      


 


12/9/17 10:20  65 18 214/99 98 Nasal Cannula 2.0 


 


12/9/17 08:45  60 18 198/88 98 Room Air  


 


12/9/17 08:44  67      


 


12/9/17 08:19 36.6 64 18 191/121 98 Room Air  











Physical Exam


CONSTITUTIONAL/VITAL SIGNS: Reviewed / noted above.


GENERAL: Non-toxic in appearance. 


INTEGUMENTARY: Warm, dry, and Pink.


HEAD: Normocephalic.


EYES: without scleral icterus or trauma.


ENT/OROPHARYNX: clear and moist.


LYMPHADENOPATHY/NECK: Is supple without lymphadenopathy or meningismus.


RESPIRATORY: Lungs clear and equal.


CARDIOVASCULAR: Regular rate and rhythm.


GI/ABDOMEN: Mildly tender to the left upper quadrant. No organomegaly or 

pulsatile mass. No rebound or guarding. Normal bowel sounds.


EXTREMITIES: Warm and well perfused.


BACK: Some tenderness to palpation of the lower lumbar region and soft tissue 

of the left paraspinal musculature. 


NEUROLOGICAL: Intact without focal deficits. 


PSYCHIATRIC: normal affect.


MUSCULOSKELETAL: Normally developed with good muscle tone.





Medical Decision & Procedures


ER Provider


Diagnostic Interpretation:


Radiology results as stated below per my review and radiologist interpretation:





CT OF THE ABDOMEN AND PELVIS WITHOUT CONTRAST, STONE PROTOCOL





CLINICAL HISTORY: Right flank pain. Back pain.    





COMPARISON STUDY:  None.





TECHNIQUE: Helical axial images of the abdomen and pelvis were obtained without


IV or oral contrast according to renal stone protocol.  A dose lowering


technique was utilized adhering to the principles of ALARA.





FINDINGS: Visualized portions of the lower chest demonstrate linear opacity


suggestive of atelectasis. There is moderate cardiomegaly. The liver is enlarged


and slightly dense. Multifocal gallbladder wall calcification is noted. There is


no pericholecystic infiltration. The gallbladder is not distended. Unenhanced


images of the the spleen, adrenal glands and pancreas are unremarkable. There is


no biliary or pancreatic ductal dilatation. No renal, ureteral or bladder


calculi are present. There is no hydronephrosis. There is no evidence for a


bowel obstruction. The appendix is normal. Uterus is surgically absent. There is


no lymphadenopathy. There is no ascites. No acute lumbar spine fracture is


present. Disc space narrowing with vacuum disc phenomenon is noted at L5-S1 and


to a lesser extent L4-L5.





IMPRESSION:  





1. No urinary calculi or hydronephrosis.





2. No bowel obstruction.





3. No acute lumbar spine fracture.





4. Gallbladder wall calcification. No evidence for acute cholecystitis.





5. Increased attenuation of the liver which may be related to amiodarone


therapy.








Electronically signed by:  Bandar Washington M.D.


12/9/2017 9:01 AM





Dictated Date/Time:  12/9/2017 8:49 AM





Laboratory Results


12/9/17 08:45








Red Blood Count 4.58, Mean Corpuscular Volume 90.4, Mean Corpuscular Hemoglobin 

29.5, Mean Corpuscular Hemoglobin Concent 32.6, Mean Platelet Volume 10.3, 

Neutrophils (%) (Auto) 75.8, Lymphocytes (%) (Auto) 11.7, Monocytes (%) (Auto) 

11.2, Eosinophils (%) (Auto) 0.8, Basophils (%) (Auto) 0.2, Neutrophils # (Auto

) 5.01, Lymphocytes # (Auto) 0.77, Monocytes # (Auto) 0.74, Eosinophils # (Auto

) 0.05, Basophils # (Auto) 0.01





12/9/17 08:45

















Test


  12/9/17


08:45


 


White Blood Count


  6.60 K/uL


(4.8-10.8)


 


Red Blood Count


  4.58 M/uL


(4.2-5.4)


 


Hemoglobin


  13.5 g/dL


(12.0-16.0)


 


Hematocrit 41.4 % (37-47) 


 


Mean Corpuscular Volume


  90.4 fL


()


 


Mean Corpuscular Hemoglobin


  29.5 pg


(25-34)


 


Mean Corpuscular Hemoglobin


Concent 32.6 g/dl


(32-36)


 


Platelet Count


  195 K/uL


(130-400)


 


Mean Platelet Volume


  10.3 fL


(7.4-10.4)


 


Neutrophils (%) (Auto) 75.8 % 


 


Lymphocytes (%) (Auto) 11.7 % 


 


Monocytes (%) (Auto) 11.2 % 


 


Eosinophils (%) (Auto) 0.8 % 


 


Basophils (%) (Auto) 0.2 % 


 


Neutrophils # (Auto)


  5.01 K/uL


(1.4-6.5)


 


Lymphocytes # (Auto)


  0.77 K/uL


(1.2-3.4)


 


Monocytes # (Auto)


  0.74 K/uL


(0.11-0.59)


 


Eosinophils # (Auto)


  0.05 K/uL


(0-0.5)


 


Basophils # (Auto)


  0.01 K/uL


(0-0.2)


 


RDW Standard Deviation


  52.5 fL


(36.4-46.3)


 


RDW Coefficient of Variation


  16.0 %


(11.5-14.5)


 


Immature Granulocyte % (Auto) 0.3 % 


 


Immature Granulocyte # (Auto)


  0.02 K/uL


(0.00-0.02)


 


Anion Gap


  7.0 mmol/L


(3-11)


 


Est Creatinine Clear Calc


Drug Dose 37.7 ml/min 


 


 


Estimated GFR (


American) 40.8 


 


 


Estimated GFR (Non-


American 35.2 


 


 


BUN/Creatinine Ratio 29.1 (10-20) 


 


Calcium Level


  9.6 mg/dl


(8.5-10.1)


 


Total Bilirubin


  0.5 mg/dl


(0.2-1)


 


Direct Bilirubin


  0.2 mg/dl


(0-0.2)


 


Aspartate Amino Transf


(AST/SGOT) 31 U/L (15-37) 


 


 


Alanine Aminotransferase


(ALT/SGPT) 46 U/L (12-78) 


 


 


Alkaline Phosphatase


  77 U/L


()


 


Total Protein


  7.9 gm/dl


(6.4-8.2)


 


Albumin


  3.4 gm/dl


(3.4-5.0)


 


Lipase


  173 U/L


()





Laboratory results as stated above per my review.





Medications Administered











 Medications


  (Trade)  Dose


 Ordered  Sig/Gaby


 Route  Start Time


 Stop Time Status Last Admin


Dose Admin


 


 Sodium Chloride  1,000 ml @ 


 999 mls/hr  Q1H1M STAT


 IV  12/9/17 08:25


 12/9/17 09:25 DC 12/9/17 08:57


999 MLS/HR


 


 Fentanyl Citrate


  (Fentanyl Inj)  100 mcg  NOW  STAT


 IV  12/9/17 08:25


 12/9/17 08:26 DC 12/9/17 08:57


100 MCG


 


 Ondansetron HCl


  (Zofran Inj)  4 mg  NOW  STAT


 IV  12/9/17 08:25


 12/9/17 08:26 DC 12/9/17 08:58


4 MG


 


 Ketorolac


 Tromethamine


  (Toradol Inj)  30 mg  NOW  STAT


 IV  12/9/17 08:25


 12/9/17 08:26 DC 12/9/17 08:58


30 MG


 


 Morphine Sulfate


  (MoRPHine


 SULFATE INJ)  4 mg  NOW  STAT


 IV  12/9/17 10:20


 12/9/17 10:21 DC 12/9/17 11:23


4 MG


 


 Clonidine HCl


  (Catapres Tab)  0.2 mg  NOW  ONCE


 PO  12/9/17 10:30


 12/9/17 10:31 DC 12/9/17 11:23


0.2 MG











ED Course


0822: Previous medical records were reviewed. The patient was evaluated in room 

A3. A complete history and physical examination was performed.





0825: Ordered Toradol Inj 30 mg IV, Zofran Inj 4 mg IV, Fentanyl Inj 100 mcg IV

, Sodium Chloride 1,000 ml @ 999 mls/hr IV. 





1017: I spoke to the patient and updated her on her results. She states that 

she would not like to go home, so I will try to get her into [facility] 

rehabilitation. 





1020: Ordered Morphine Sulfate 4 mg IV. 





1030: Ordered Clonidine HCl 0.2 mg PO. 





1310: On reevaluation, the patient is resting. I discussed the results and 

findings with her. She verbalized agreement of the treatment plan. I spoke with 

case management who said that the patient has been accepted at [facility]. 

The patient will be evaluated for further management and care.





Medical Decision


Differential considered: pancreatitis, hepatitis, or acute cholecystitis, AAA,  

UTI, pyelonephritis, kidney stones, appendicitis, diverticulitis, shingles, 

bowel obstruction mesenteric ischemia, intussusception,hernia. 





This is an 82-year-old female who presents to the ED with a chief complaint of 

back pain.  The patient was evaluated for the same 3 days ago and had a CT scan 

of the lumbar spine as well as chest x-ray and blood work.  She was felt to 

have musculoskeletal back pain.  She returns today stating that the Percocet 

doesn't seem to help her pain completely.  Her exam is noted above.  Her exam 

appears to be musculoskeletal in nature.  CT scan of the abdomen pelvis did not 

show acute process.  BUN and creatinine are slightly elevated.  Blood work was 

otherwise unremarkable.  The patient was treated with IV fentanyl by EMS.  She 

was given IV fentanyl, IV Toradol and IV morphine here.  Her blood pressure was 

elevated during her stay.  She was given 0.2 mg of clonidine for this.  She 

could not recall she had taken her blood pressure medication this morning.  

This elevation can be related to her pain as well.  The patient is felt to be 

stable for outpatient management.  She does not feel comfortable going home 

because of her pain.  [facility] referral has been made.  She was accepted 

there and will be transported there.





Medication Reconcilliation


Current Medication List:  was personally reviewed by me





Blood Pressure Screening


Patient's blood pressure:  Elevated blood pressure


Blood pressure disposition:  Referred to PCP





Consults


Time Called:  1017


Consulting Physician:  


Returned Call:  1310


I spoke with the  and the patient has been accepted at [facility].





Impression





 Primary Impression:  


 Back pain


 Additional Impression:  


 HTN (hypertension)





Scribe Attestation


The scribe's documentation has been prepared under my direction and personally 

reviewed by me in its entirety. I confirm that the note above accurately 

reflects all work, treatment, procedures, and medical decision making performed 

by me.





Departure Information


Dispostion


Rehab Inpatient Facility





Referrals


Sharan Olsen PA-C (PCP)





Forms


HOME CARE DOCUMENTATION FORM,                                                 

               IMPORTANT VISIT INFORMATION





Patient Instructions


My [facility]





Problem Qualifiers

## 2017-12-09 NOTE — DIAGNOSTIC IMAGING REPORT
CT OF THE ABDOMEN AND PELVIS WITHOUT CONTRAST, STONE PROTOCOL



CLINICAL HISTORY: Right flank pain. Back pain.    



COMPARISON STUDY:  None.



TECHNIQUE: Helical axial images of the abdomen and pelvis were obtained without

IV or oral contrast according to renal stone protocol.  A dose lowering

technique was utilized adhering to the principles of ALARA.



FINDINGS: Visualized portions of the lower chest demonstrate linear opacity

suggestive of atelectasis. There is moderate cardiomegaly. The liver is enlarged

and slightly dense. Multifocal gallbladder wall calcification is noted. There is

no pericholecystic infiltration. The gallbladder is not distended. Unenhanced

images of the the spleen, adrenal glands and pancreas are unremarkable. There is

no biliary or pancreatic ductal dilatation. No renal, ureteral or bladder

calculi are present. There is no hydronephrosis. There is no evidence for a

bowel obstruction. The appendix is normal. Uterus is surgically absent. There is

no lymphadenopathy. There is no ascites. No acute lumbar spine fracture is

present. Disc space narrowing with vacuum disc phenomenon is noted at L5-S1 and

to a lesser extent L4-L5.



IMPRESSION:  



1. No urinary calculi or hydronephrosis.



2. No bowel obstruction.



3. No acute lumbar spine fracture.



4. Gallbladder wall calcification. No evidence for acute cholecystitis.



5. Increased attenuation of the liver which may be related to amiodarone

therapy.





Electronically signed by:  Bandar Washington M.D.

12/9/2017 9:01 AM



Dictated Date/Time:  12/9/2017 8:49 AM

## 2018-01-12 ENCOUNTER — HOSPITAL ENCOUNTER (OUTPATIENT)
Dept: HOSPITAL 45 - C.LABPBG | Age: 83
Discharge: HOME | End: 2018-01-12
Attending: PHYSICIAN ASSISTANT
Payer: COMMERCIAL

## 2018-01-12 DIAGNOSIS — I50.32: Primary | ICD-10-CM

## 2018-01-12 LAB
BUN SERPL-MCNC: 23 MG/DL (ref 7–18)
CALCIUM SERPL-MCNC: 9.3 MG/DL (ref 8.5–10.1)
CO2 SERPL-SCNC: 31 MMOL/L (ref 21–32)
CREAT SERPL-MCNC: 1.19 MG/DL (ref 0.6–1.2)
GLUCOSE SERPL-MCNC: 253 MG/DL (ref 70–99)
POTASSIUM SERPL-SCNC: 4 MMOL/L (ref 3.5–5.1)
SODIUM SERPL-SCNC: 138 MMOL/L (ref 136–145)

## 2018-01-29 ENCOUNTER — HOSPITAL ENCOUNTER (OUTPATIENT)
Dept: HOSPITAL 45 - C.LABPBG | Age: 83
Discharge: HOME | End: 2018-01-29
Attending: INTERNAL MEDICINE
Payer: COMMERCIAL

## 2018-01-29 DIAGNOSIS — I50.33: ICD-10-CM

## 2018-01-29 DIAGNOSIS — J98.4: ICD-10-CM

## 2018-01-29 DIAGNOSIS — I11.0: ICD-10-CM

## 2018-01-29 DIAGNOSIS — R06.09: ICD-10-CM

## 2018-01-29 DIAGNOSIS — I48.91: Primary | ICD-10-CM

## 2018-01-29 DIAGNOSIS — J44.9: ICD-10-CM

## 2018-01-29 LAB
BUN SERPL-MCNC: 22 MG/DL (ref 7–18)
CALCIUM SERPL-MCNC: 9.3 MG/DL (ref 8.5–10.1)
CO2 SERPL-SCNC: 31 MMOL/L (ref 21–32)
CREAT SERPL-MCNC: 1.38 MG/DL (ref 0.6–1.2)
GLUCOSE SERPL-MCNC: 240 MG/DL (ref 70–99)
POTASSIUM SERPL-SCNC: 3.9 MMOL/L (ref 3.5–5.1)
SODIUM SERPL-SCNC: 138 MMOL/L (ref 136–145)

## 2018-02-08 ENCOUNTER — HOSPITAL ENCOUNTER (INPATIENT)
Dept: HOSPITAL 45 - C.EDB | Age: 83
LOS: 3 days | Discharge: HOME | DRG: 193 | End: 2018-02-11
Attending: HOSPITALIST | Admitting: HOSPITALIST
Payer: COMMERCIAL

## 2018-02-08 VITALS
BODY MASS INDEX: 32.47 KG/M2 | WEIGHT: 194.89 LBS | HEIGHT: 65 IN | WEIGHT: 194.89 LBS | BODY MASS INDEX: 32.47 KG/M2 | HEIGHT: 65 IN

## 2018-02-08 VITALS — OXYGEN SATURATION: 90 % | HEART RATE: 68 BPM

## 2018-02-08 VITALS — OXYGEN SATURATION: 96 %

## 2018-02-08 VITALS — DIASTOLIC BLOOD PRESSURE: 69 MMHG | HEART RATE: 73 BPM | SYSTOLIC BLOOD PRESSURE: 123 MMHG

## 2018-02-08 VITALS
SYSTOLIC BLOOD PRESSURE: 104 MMHG | DIASTOLIC BLOOD PRESSURE: 59 MMHG | TEMPERATURE: 98.06 F | OXYGEN SATURATION: 96 % | HEART RATE: 79 BPM

## 2018-02-08 DIAGNOSIS — Z87.19: ICD-10-CM

## 2018-02-08 DIAGNOSIS — T79.6XXA: ICD-10-CM

## 2018-02-08 DIAGNOSIS — D64.9: ICD-10-CM

## 2018-02-08 DIAGNOSIS — E78.5: ICD-10-CM

## 2018-02-08 DIAGNOSIS — J45.909: ICD-10-CM

## 2018-02-08 DIAGNOSIS — I48.0: ICD-10-CM

## 2018-02-08 DIAGNOSIS — J96.01: ICD-10-CM

## 2018-02-08 DIAGNOSIS — I45.81: ICD-10-CM

## 2018-02-08 DIAGNOSIS — I11.0: ICD-10-CM

## 2018-02-08 DIAGNOSIS — S40.021A: ICD-10-CM

## 2018-02-08 DIAGNOSIS — Z88.1: ICD-10-CM

## 2018-02-08 DIAGNOSIS — Z79.899: ICD-10-CM

## 2018-02-08 DIAGNOSIS — Z87.891: ICD-10-CM

## 2018-02-08 DIAGNOSIS — Z79.84: ICD-10-CM

## 2018-02-08 DIAGNOSIS — E11.9: ICD-10-CM
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
## 2018-02-08 DIAGNOSIS — N17.9: ICD-10-CM

## 2018-02-08 DIAGNOSIS — E03.9: ICD-10-CM

## 2018-02-08 DIAGNOSIS — J10.1: Primary | ICD-10-CM

## 2018-02-08 DIAGNOSIS — J44.1: ICD-10-CM
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
## 2018-02-08 DIAGNOSIS — I24.8: ICD-10-CM

## 2018-02-08 DIAGNOSIS — S40.022A: ICD-10-CM

## 2018-02-08 DIAGNOSIS — I50.32: ICD-10-CM

## 2018-02-08 LAB
ALBUMIN SERPL-MCNC: 3.5 GM/DL (ref 3.4–5)
ALP SERPL-CCNC: 108 U/L (ref 45–117)
ALT SERPL-CCNC: 232 U/L (ref 12–78)
AST SERPL-CCNC: 335 U/L (ref 15–37)
BASOPHILS # BLD: 0.01 K/UL (ref 0–0.2)
BASOPHILS NFR BLD: 0.1 %
BUN SERPL-MCNC: 20 MG/DL (ref 7–18)
CALCIUM SERPL-MCNC: 9.4 MG/DL (ref 8.5–10.1)
CK MB SERPL-MCNC: 9 NG/ML (ref 0.5–3.6)
CO2 SERPL-SCNC: 30 MMOL/L (ref 21–32)
CREAT SERPL-MCNC: 1.25 MG/DL (ref 0.6–1.2)
EOS ABS #: 0 K/UL (ref 0–0.5)
EOSINOPHIL NFR BLD AUTO: 190 K/UL (ref 130–400)
GLUCOSE SERPL-MCNC: 281 MG/DL (ref 70–99)
HCT VFR BLD CALC: 41.3 % (ref 37–47)
HGB BLD-MCNC: 13.7 G/DL (ref 12–16)
IG#: 0.03 K/UL (ref 0–0.02)
IMM GRANULOCYTES NFR BLD AUTO: 4.2 %
INFLUENZA B ANTIGEN: (no result)
INR PPP: 1.2 (ref 0.9–1.1)
LIPASE: 88 U/L (ref 73–393)
LYMPHOCYTES # BLD: 0.46 K/UL (ref 1.2–3.4)
MCH RBC QN AUTO: 31.4 PG (ref 25–34)
MCHC RBC AUTO-ENTMCNC: 33.2 G/DL (ref 32–36)
MCV RBC AUTO: 94.5 FL (ref 80–100)
MONO ABS #: 0.89 K/UL (ref 0.11–0.59)
MONOCYTES NFR BLD: 8.1 %
NEUT ABS #: 9.56 K/UL (ref 1.4–6.5)
NEUTROPHILS # BLD AUTO: 0 %
NEUTROPHILS NFR BLD AUTO: 87.3 %
PHOSPHATE SERPL-MCNC: 3.2 MG/DL (ref 2.5–4.9)
PMV BLD AUTO: 11 FL (ref 7.4–10.4)
POTASSIUM SERPL-SCNC: 3.3 MMOL/L (ref 3.5–5.1)
PROT SERPL-MCNC: 8.2 GM/DL (ref 6.4–8.2)
PTT PATIENT: 26.1 SECONDS (ref 21–31)
RED CELL DISTRIBUTION WIDTH CV: 16.6 % (ref 11.5–14.5)
RED CELL DISTRIBUTION WIDTH SD: 57.8 FL (ref 36.4–46.3)
SODIUM SERPL-SCNC: 133 MMOL/L (ref 136–145)
WBC # BLD AUTO: 10.95 K/UL (ref 4.8–10.8)

## 2018-02-08 RX ADMIN — OSELTAMIVIR PHOSPHATE SCH MG: 6 POWDER, FOR SUSPENSION ORAL at 21:13

## 2018-02-08 RX ADMIN — SODIUM CHLORIDE SCH MLS/HR: 900 INJECTION, SOLUTION INTRAVENOUS at 15:36

## 2018-02-08 RX ADMIN — LEVALBUTEROL SCH MG: 1.25 SOLUTION, CONCENTRATE RESPIRATORY (INHALATION) at 19:01

## 2018-02-08 RX ADMIN — CARVEDILOL SCH MG: 6.25 TABLET, FILM COATED ORAL at 21:15

## 2018-02-08 RX ADMIN — INSULIN ASPART SCH UNITS: 100 INJECTION, SOLUTION INTRAVENOUS; SUBCUTANEOUS at 21:11

## 2018-02-08 RX ADMIN — IPRATROPIUM BROMIDE SCH MG: 0.5 SOLUTION RESPIRATORY (INHALATION) at 19:01

## 2018-02-08 RX ADMIN — INSULIN ASPART SCH UNITS: 100 INJECTION, SOLUTION INTRAVENOUS; SUBCUTANEOUS at 16:00

## 2018-02-08 NOTE — HISTORY AND PHYSICAL
History & Physical


Date & Time of Service:


Feb 8, 2018 at 16:00


Chief Complaint:


Breathing Difficulty


Primary Care Physician:


Sharan Olsen PA-C


History of Present Illness


Source:  patient


Ms. Aguilar is an 81 y/o female with PMHx of T2DM, Chronic Diastolic CHF, 

COPD, Severe Restrictive Lung Disease, Paroxysmal Atrial Fibrillation S/P 

Cardioversion x 2, HTN, Anemia, Chronic Gastritis, and Hypothyroidism who 

presents to the ED due to a fall and wheezing. Patient states she has been in 

her normal state of health up until last night. She states she fell around 9 PM 

last night and was not able to get up. Her sister found her on the floor this 

AM around 8-9 AM. She states her legs just gave away but she does not think she 

hit or head and denies LOC. She fell directly on the floor and was laying on a 

rug. Upon EMS arrival, she was severely SOB with saturations at 60% and 

appeared cyanotic. She has noticed wheezing over the past couple days but 

states it has been controlled with her inhalers. She reports an associated 

cough that is occ. productive of sputum. She denies fever/chills or CP. She 

reports pain in the R arm and b/l knees from trying to get up. She is influenza 

A positive. She lives at home with her  who is actually admitted to [facility] 

currently with influenza A.





Past Medical/Surgical History


1. Cardiomyopathy


2. Chronic Diastolic CHF


3. COPD


4. Atrial Fibrillation S/P Cardioversion x 2


5. HTN


6. Anemia


7. Former Tobacco User


8. H/O H. pylori


9. Chronic Gastritis


10. Hypothyroidism


11. Diverticulosis





Family History





Patient reports no known family medical history.





Social History


Smoking Status:  Former Smoker


Smokeless Tobacco Use:  No


Alcohol Use:  none


Drug Use:  none


Marital Status:  


Housing status:  lives with significant other


Occupational Status:  retired





Immunizations


History of Influenza Vaccine:  Unknown


History of Tetanus Vaccine?:  Unknown


History of Pneumococcal:  Unknown


History of Hepatitis B Vaccine:  Unknown





Multi-Drug Resistant Organisms


History of MDRO:  No





Allergies


Coded Allergies:  


     Oxytetracycline (Verified  Allergy, Unknown, RASH, 12/6/17)


     Polymyxin B (Verified  Allergy, Unknown, RASH, 12/6/17)





Home Medications


Scheduled


Ascorbic Acid (Ascorbic Acid), 500 MG PO DAILY


Atorvastatin (Lipitor), 10 MG PO HS


Carvedilol (Coreg), 6.25 MG PO BID


Ferrous Sulfate (Ferrous Sulfate), 325 MG PO DAILY


Fluticasone Furoate-Vilanterol (Breo Ellipta), 1 PUFF PO DAILY


Furosemide (Lasix), 120 MG PO DAILY


Glipizide (Glipizide Er), 10 MG PO BID


Levothyroxine Sodium (Synthroid), 112 MCG PO DAILY


Losartan Potassium (Cozaar), 100 MG PO DAILY


Magnesium Oxide (Magnesium), 500 MG PO BID





Review of Systems


General/Constitutional: + generalized weakness; Denies fever/chills


ENT: Denies visual changes, nasal drainage, hearing loss, sore throat, trouble 

swallowing


Cardiovascular: Denies chest pain, palpitations


Respiratory: + cough, + wheezing; Denies SOB, orthopnea


GI: Denies nausea, vomiting, abdominal pain, constipation, diarrhea, melena/

hematochezia


: Denies dysuria


Musculoskeletal: + b/l knee and R arm pain, + chronic b/l leg swelling


Neurologic: Denies dizziness/lightheadedness, numbness/tingling


Hematologic/Lymphatic: Denies bleeding/clotting abnormalities


Skin: + reddened knees b/l; Denies rash





Physical Exam


Vital Signs











  Date Time  Temp Pulse Resp B/P (MAP) Pulse Ox O2 Delivery O2 Flow Rate FiO2


 


2/8/18 14:56  81  134/98    


 


2/8/18 14:52  82 18  97   


 


2/8/18 14:46    190/91    





    190/91    


 


2/8/18 14:37  84 16  96   


 


2/8/18 14:33    167/90    





    167/90    


 


2/8/18 14:02  80 20  100   


 


2/8/18 13:47  78 19  100   


 


2/8/18 13:32  86 24  97   


 


2/8/18 13:31    182/101    


 


2/8/18 13:25 36.4 86 20 119/89 100 Nasal Cannula 3.0 


 


2/8/18 13:25     100 Nasal Cannula 3.0 


 


2/8/18 13:17  85 23  98   


 


2/8/18 13:16    167/104    


 


2/8/18 13:07    119/89    


 


2/8/18 13:07  86      


 


2/8/18 13:05    167/153    


 


2/8/18 12:59     100 Nasal Cannula 3.0 


 


2/8/18 12:55 36.4 86 20 119/89 85 Room Air  


 


2/8/18 12:55     85 Room Air  








General Appearance: WDWN in NAD who is A&O x 3


HEENT: Head is normocephalic/atraumatic; Hearing grossly intact; Mucous 

membranes dry; Pharynx negative for exudate/lesions but erythematous


Neck: Supple; Trachea midline; Neg JVD; Neg lymphadenopathy


Heart: RRR with no M/G/R


Lungs: Diminished bilaterally with expiratory wheeze; Respirations unlabored; 

Neg accessory muscle use


Abdomen: Soft, non-tender, non-distended; Positive BS x 4 quadrants


Extremities: Capillary refill < 2 seconds; Neg cyanosis or edema


Neurological: Speech clear; Gross motor/sensory function intact; Neg focal 

neurologic deficits


Psychiatric: Appropriate mood/affect


Skin: Normal Color; Warm/Dry; scattered ecchymosis; b/l knees erythematous with 

some mottling of the R side





Diagnostics


Laboratory Results





Results Past 24 Hours








Test


  2/8/18


13:00 2/8/18


13:11 2/8/18


13:40 2/8/18


14:00 Range/Units


 


 


White Blood Count 10.95    4.8-10.8  K/uL


 


Red Blood Count 4.37    4.2-5.4  M/uL


 


Hemoglobin 13.7    12.0-16.0  g/dL


 


Hematocrit 41.3    37-47  %


 


Mean Corpuscular Volume 94.5      fL


 


Mean Corpuscular Hemoglobin 31.4    25-34  pg


 


Mean Corpuscular Hemoglobin


Concent 33.2


  


  


  


  32-36  g/dl


 


 


Platelet Count 190    130-400  K/uL


 


Mean Platelet Volume 11.0    7.4-10.4  fL


 


Neutrophils (%) (Auto) 87.3     %


 


Lymphocytes (%) (Auto) 4.2     %


 


Monocytes (%) (Auto) 8.1     %


 


Eosinophils (%) (Auto) 0.0     %


 


Basophils (%) (Auto) 0.1     %


 


Neutrophils # (Auto) 9.56    1.4-6.5  K/uL


 


Lymphocytes # (Auto) 0.46    1.2-3.4  K/uL


 


Monocytes # (Auto) 0.89    0.11-0.59  K/uL


 


Eosinophils # (Auto) 0.00    0-0.5  K/uL


 


Basophils # (Auto) 0.01    0-0.2  K/uL


 


RDW Standard Deviation 57.8    36.4-46.3  fL


 


RDW Coefficient of Variation 16.6    11.5-14.5  %


 


Immature Granulocyte % (Auto) 0.3     %


 


Immature Granulocyte # (Auto) 0.03    0.00-0.02  K/uL


 


Erythrocyte Sedimentation Rate 40    0-21  mm/hr


 


Prothrombin Time


  12.5


  


  


  


  9.0-12.0


SECONDS


 


Prothromb Time International


Ratio 1.2


  


  


  


  0.9-1.1  


 


 


Activated Partial


Thromboplast Time 26.1


  


  


  


  21.0-31.0


SECONDS


 


Partial Thromboplastin Ratio 1.0     


 


Sodium Level 133    136-145  mmol/L


 


Potassium Level 3.3    3.5-5.1  mmol/L


 


Chloride Level 97      mmol/L


 


Carbon Dioxide Level 30    21-32  mmol/L


 


Anion Gap 7.0    3-11  mmol/L


 


Blood Urea Nitrogen 20    7-18  mg/dl


 


Creatinine


  1.25


  


  


  


  0.60-1.20


mg/dl


 


Est Creatinine Clear Calc


Drug Dose 38.8


  


  


  


   ml/min


 


 


Estimated GFR (


American) 46.4


  


  


  


   


 


 


Estimated GFR (Non-


American 40.0


  


  


  


   


 


 


BUN/Creatinine Ratio 16.2    10-20  


 


Random Glucose 281    70-99  mg/dl


 


Calcium Level 9.4    8.5-10.1  mg/dl


 


Phosphorus Level 3.2    2.5-4.9  mg/dl


 


Magnesium Level 2.2    1.8-2.4  mg/dl


 


Total Bilirubin 1.3    0.2-1  mg/dl


 


Aspartate Amino Transf


(AST/SGOT) 335


  


  


  


  15-37  U/L


 


 


Alanine Aminotransferase


(ALT/SGPT) 232


  


  


  


  12-78  U/L


 


 


Alkaline Phosphatase 108      U/L


 


Total Creatine Kinase 1619      U/L


 


Creatine Kinase MB 9.0    0.5-3.6  ng/ml


 


Creatine Kinase MB Ratio 0.6    0-3.0  


 


Troponin I 0.282    0-0.045  ng/ml


 


C-Reactive Protein 5.36    0-0.29  mg/dl


 


Pro-B-Type Natriuretic Peptide 26126    0-1800  pg/ml


 


Total Protein 8.2    6.4-8.2  gm/dl


 


Albumin 3.5    3.4-5.0  gm/dl


 


Globulin 4.7    2.5-4.0  gm/dl


 


Albumin/Globulin Ratio 0.7    0.9-2  


 


Lipase 88      U/L


 


Bedside Lactic Acid Venous


  


  2.35


  


  


  0.90-1.70


mmol/L


 


Bedside Troponin I   0.220  0-0.045  ng/ml


 


Influenza Type A Antigen    POS for Influ A NEG  


 


Influenza Type B Antigen    Neg for Influ B NEG  


 


Test


  2/8/18


14:45 2/8/18


15:13 


  


  Range/Units


 


 


Urine Color DK YELLOW     


 


Urine Appearance CLEAR    CLEAR  


 


Urine pH 5.5    4.5-7.5  


 


Urine Specific Gravity 1.020    1.000-1.030  


 


Urine Protein 1+    NEG  


 


Urine Glucose (UA) 2+    NEG  


 


Urine Ketones NEG    NEG  


 


Urine Occult Blood 2+    NEG  


 


Urine Nitrite NEG    NEG  


 


Urine Bilirubin NEG    NEG  


 


Urine Urobilinogen NEG    NEG  


 


Urine Leukocyte Esterase NEG    NEG  


 


Urine WBC (Auto) 1-5    0-5  /hpf


 


Urine RBC (Auto) 0-4    0-4  /hpf


 


Urine Hyaline Casts (Auto) 10-30    0-5  /lpf


 


Urine Epithelial Cells (Auto) 10-20    0-5  /lpf


 


Urine Bacteria (Auto) NEG    NEG  


 


Venous Blood pH  7.36   7.36-7.41  


 


Venous Blood Partial Pressure


CO2 


  57


  


  


  38.0-50.0  mmHg


 


 


Venous Blood Partial Pressure


O2 


  26


  


  


   mmHg


 


 


Venous Blood HCO3  32    mmol/L


 


Venous Blood Oxygen Saturation  < 60.0    %


 


Venous Blood Base Excess  4.5    mEq/L


 


Carboxyhemoglobin  0.0    % [facility]








Microbiology Results


2/8/18 Blood Culture, Received


         Pending


2/8/18 Blood Culture, Received


         Pending





Diagnostic Radiology


CHEST ONE VIEW PORTABLE





FINDINGS: 


Patient is mildly rotated to the right. Cardiac silhouette is again enlarged,


unchanged. There is no pneumothorax or large pleural effusion. Chronic blunting


of the costophrenic angles with unchanged linear subsegmental


atelectasis/scarring of the lung bases, left greater than right. Hazy


ill-defined opacity of the right upper lobe. Bones appear grossly intact.





IMPRESSION: 


1. Ill-defined hazy opacity of the right upper lobe is favored to reflect


composite artifact with patient rotation. Consider follow-up PA and lateral


views of the chest to further characterize if of further clinical concern.


2. Cardiomegaly without overt pulmonary edema.


3. Unchanged linear subsegmental bibasilar atelectasis/scarring.





EKG


Sinus rhythm with occasional Premature ventricular complexes


Left axis deviation


Low voltage QRS


Inferior infarct , age undetermined


Cannot rule out Anteroseptal infarct (cited on or before 15-DEC-2016)


Prolonged QT


Abnormal ECG


When compared with ECG of 06-DEC-2017 06:17,


QRS axis Shifted left


Inferior infarct is now Present ...





Impression


Assessment and Plan


Ms. Aguilar is an 81 y/o female with PMHx of T2DM, Chronic Diastolic CHF, 

COPD, Severe Restrictive Lung Disease, Paroxysmal Atrial Fibrillation S/P 

Cardioversion x 2, HTN, Anemia, Chronic Gastritis, and Hypothyroidism who 

presents to the ED due to a fall and wheezing.





Acute Hypoxic Respiratory Failure 2/2 Influenza A and COPD Exacerbation/Severe 

Restrictive Lung Disease:


- Utilizes PRN O2 but reports rarely needs this


- Tamiflu 30 mg BID for renal dosing


- Xopenex/Atrovent nebs


- Prednisone 40 mg daily


- BNP and trops elevated which may be from slight straight/rhabdo - patient 

appears dry on examination





Possible CAP:


- Question of infiltrate vs artifact - repeat CXR in AM


- Given Levaquin in ED - presence of QT prolongation on EKG and will hold 

further antibiotics today


- May benefit from atypical coverage - however tetracycline allergy and current 

prolongation cannot use Zithromax





Mild Rhabdomyolysis 2/2 Mechanical Fall from Generalized Weakness:


- CK 1600 and will gentle hydrate with NSS 80 mL/hr





QT Prolongation:


- Does not appear to be on medications that could cause this - will replete 

electrolyte abnormalities and obtain EKG is AM


- Mag Sulfate 1 g IV now and replace K now - repeat EKG in AM


- Given Levaquin in ED and will hold further antibiotics today and will 

reassess need in AM pending EKG and CXR





Chronic Diastolic CHF and HTN:


- Coreg 6.25 mg BID


- Hold Losartan


- Hold Lasix - of note patient should be on 80 mg AM and 40 mg PM but only 

takes the morning dose





T2DM:


- Hold Glipizide and cover with SSI





Paroxysmal Atrial Fibrillation S/P Cardioversion x 2:


- Was previously on Amiodarone but was D/C'd due to suspected toxicity; was 

previously on AC but was having drops in Hgb without a source and has been 

maintained off with stable Hgbs - suspicion was a GI source





Code Status: FULL RESUSCITATION


- Patient asked what my opinion was for her...did talk about options with her 

but she didn't make a decision. Can be readdressed with her





DVT Prophylaxis: SCDs





Disposition:


- PT/OT evaluations - lives with her  who is currently here for 

Influenza A and ambulatory dysfunction with plans to go to rehab








Resident Physician Supervision Note:





I was present with A Colleen PA during the history and exam. I discussed the 

case with the PA and agree with the findings and plan as documented in the 

note.  Any exceptions or clarifications are listed here:





81 y/o F  DM II, Chronic Diastolic CHF, COPD, Restrictive Lung Disease, PAF, HTN

, Anemia, Chronic Gastritis, Hypothyroid


Presents following a fall - had spent an significant time period on the floor, 

developed mild rhhabdo and SOB - pts  is currently in hospital with flu 

and she herself is (+) for influ A.





OE


AAO x 2


S1,2 R +M


Poor b/l air movement and end exp wheezing


NT, ND


No CCE





P:


Placed on Tamiflu and will treat for COPD exacerbation - steroids, nebs, abx, 02


Due to rhabdo, lasix held, IVF - monitor volume status 


Trop is elevated - likely due to rhabdo - we will trend 


Placed on SS for DM


Will likely need rehab on DC


Pt was provided with K and Mg on arrival due to low K and prolonged QT








Documented By:  Neo Sahu





Level of Care


Telemetry





Resuscitation Status


FULL RESUSCITATION





VTE Prophylaxis


VTE Risk Assessment Done? Y/N:  Yes


Risk Level:  Moderate


Given or contraindicated:  SCD's

## 2018-02-08 NOTE — DIAGNOSTIC IMAGING REPORT
CT HEAD WITHOUT CONTRAST (CT)



CLINICAL HISTORY: Head trauma. Fall. Pain.    



COMPARISON STUDY:  No previous studies for comparison.



TECHNIQUE:  Axial CT of the brain is performed from the vertex to the skull

base. IV contrast was not administered for this examination. A dose lowering

technique was utilized adhering to the principles of ALARA.

 



CT DOSE:    



FINDINGS:



No intra or extra-axial mass lesions are visualized. There is no CT evidence of

acute cortical infarction. There is no evidence of midline shift. There is no

acute  hemorrhage. No calvarial fractures are visualized. 

There are minor white matter hypodensities likely on a small vessel basis.

There is no evidence of pathologic ventricular dilatation.

There is no evidence of acute sinusitis



IMPRESSION: No acute intracranial findings







Electronically signed by:  Darryl Valenzuela M.D.

2/8/2018 2:33 PM



Dictated Date/Time:  2/8/2018 2:32 PM

## 2018-02-08 NOTE — EMERGENCY ROOM VISIT NOTE
History


Report prepared by Gume:  Oz Ulloa


Under the Supervision of:  Dr. Henrique Bentley D.O.


First contact with patient:  13:11


Chief Complaint:  SHORTNESS OF BREATH


Stated Complaint:  BREATHING DIFFICULTY


Nursing Triage Summary:  


patient fell last evening and was unable to get up. she was found this am by a 


family member and called 911 on scene she was severely short of breath with a 


sat of 60% with cyanosis. continues with shorntess of breath and wheezing. she 


has multiple bruises on the left arm  from trying to get up





History of Present Illness


The patient is a 82 year old female who presents to the Emergency Room with 

complaints of constant weakness that began last night. The patient states that 

she was feeling fine yesterday. The patient states that she fell last night 

around 2100and tried to get up, but was not able to. She reports that she was 

found on the floor by her family this morning with bruises on her arms 

bilaterally. Her family reports that the patient was still not able to get up 

this morning due to her weakness. The patient states that she was short of 

breath and her right arm became painful following incident. She reports that 

she has also been experiencing a cough. Her family states that they noticed the 

patient has been wheezing today, which she was not experiencing yesterday. The 

patient denies urinary symptoms, any pain, abdominal pain, leg pain, hip pain, 

and a history of a stroke. She reports that she was recently diagnosed with a 

collapsed vertebrate by Dr. Muniz two days ago.





   Source of History:  patient, family


   Onset:  last night


   Position:  other (global)


   Quality:  other (global)


   Timing:  constant


   Associated Symptoms:  + cough, + SOB, No abdominal pain, No urinary symptoms


Note:


Associated symptoms: wheezing, right arm pain.





Review of Systems


See HPI for pertinent positives & negatives. A total of 10 systems reviewed and 

were otherwise negative.





Past Medical & Surgical


Medical Problems:


(1) Acute exacerbation of CHF (congestive heart failure)


(2) Afib


(3) Asthma


(4) Congestive heart failure


(5) History of high blood pressure


(6) HTN (hypertension)


(7) Hyperlipidemia


(8) Systolic CHF, acute


(9) T2DM (type 2 diabetes mellitus)








Family History





Patient reports no known family medical history.





Social History


Smoking Status:  Former Smoker


Alcohol Use:  none


Drug Use:  none


Marital Status:  


Occupation Status:  retired





Current/Historical Medications


Scheduled


Ascorbic Acid (Ascorbic Acid), 500 MG PO DAILY


Atorvastatin (Lipitor), 10 MG PO HS


Carvedilol (Coreg), 6.25 MG PO BID


Ferrous Sulfate (Ferrous Sulfate), 325 MG PO DAILY


Fluticasone Furoate-Vilanterol (Breo Ellipta), 1 PUFF PO DAILY


Furosemide (Lasix), 120 MG PO DAILY


Glipizide (Glipizide Er), 10 MG PO BID


Levothyroxine Sodium (Synthroid), 112 MCG PO DAILY


Losartan Potassium (Cozaar), 100 MG PO DAILY


Magnesium Oxide (Magnesium), 500 MG PO BID





Allergies


Coded Allergies:  


     Oxytetracycline (Verified  Allergy, Unknown, RASH, 12/6/17)


     Polymyxin B (Verified  Allergy, Unknown, RASH, 12/6/17)





Physical Exam


Vital Signs











  Date Time  Temp Pulse Resp B/P (MAP) Pulse Ox O2 Delivery O2 Flow Rate FiO2


 


2/8/18 14:56  81  134/98    


 


2/8/18 14:52  82 18  97   


 


2/8/18 14:46    190/91    





    190/91    


 


2/8/18 14:37  84 16  96   


 


2/8/18 14:33    167/90    





    167/90    


 


2/8/18 14:02  80 20  100   


 


2/8/18 13:47  78 19  100   


 


2/8/18 13:32  86 24  97   


 


2/8/18 13:31    182/101    


 


2/8/18 13:25 36.4 86 20 119/89 100 Nasal Cannula 3.0 


 


2/8/18 13:25     100 Nasal Cannula 3.0 


 


2/8/18 13:17  85 23  98   


 


2/8/18 13:16    167/104    


 


2/8/18 13:07    119/89    


 


2/8/18 13:07  86      


 


2/8/18 13:05    167/153    


 


2/8/18 12:59     100 Nasal Cannula 3.0 


 


2/8/18 12:55 36.4 86 20 119/89 85 Room Air  


 


2/8/18 12:55     85 Room Air  











Physical Exam


GENERAL:  Patient is awake, alert, and in no acute distress. Patient is resting 

comfortably and was somewhat anxious appearing.


EYES: The conjunctivae are clear.  The pupils are round and reactive. 


EARS, NOSE, MOUTH AND THROAT: The nose is without any evidence of any 

deformity. Mucous membranes are dry. Tongue is midline 


NECK: The neck is nontender and supple.


RESPIRATORY: Diminished breath sounds throughout. Rales at both bases. 

Significant tachypnea appreciated.


CARDIOVASCULAR:  Regular rate and rhythm noted there no murmurs rubs or gallops 

normal S1 normal S2 


GASTROINTESTINAL: The abdomen is soft. Bowel sounds are present in all 

quadrants. Abdomen is nontender


BACK:  No midline tenderness or or step-off noted range of motion in flexion 

extension as well as rotation no signs of muscle spasm noted


MUSCULOSKELETAL/EXTREMITIES: There is no evidence of gross deformity full range 

of motion is noted in the hips and shoulders. Multiple areas of ecchymosis over 

upper extremities, left greater than right. No deformities appreciated. ROM in 

hips and shoulders appears intact.


SKIN: There is no obvious evidence of any rash. There are no petechiae, pallor 

or cyanosis noted. Pedal edema bilaterally.


NEUROLOGIC:  Patient is awake alert and oriented x3 strength appeared 

diminished in right upper and right lower extremity compared to the left.





Medical Decision & Procedures


ER Provider


Diagnostic Interpretation:


Radiology results as stated below per my review and radiologist interpretation:





CHEST ONE VIEW PORTABLE





HISTORY:  82 years-old Female shortness of breath acute shortness of breath





COMPARISON: Chest radiograph 12/6/2017





TECHNIQUE: Portable AP view of the chest





FINDINGS: 


Patient is mildly rotated to the right. Cardiac silhouette is again enlarged,


unchanged. There is no pneumothorax or large pleural effusion. Chronic blunting


of the costophrenic angles with unchanged linear subsegmental


atelectasis/scarring of the lung bases, left greater than right. Hazy


ill-defined opacity of the right upper lobe. Bones appear grossly intact.





IMPRESSION: 


1. Ill-defined hazy opacity of the right upper lobe is favored to reflect


composite artifact with patient rotation. Consider follow-up PA and lateral


views of the chest to further characterize if of further clinical concern.





2. Cardiomegaly without overt pulmonary edema.





3. Unchanged linear subsegmental bibasilar atelectasis/scarring.











The above report was generated using voice recognition software. It may contain


grammatical, syntax or spelling errors.











Electronically signed by:  Cale Washington M.D.


2/8/2018 1:33 PM





Dictated Date/Time:  2/8/2018 1:31 PM








PELVIS 1 OR 2 VIEW ROUTINE





HISTORY:  82 years-old Female fall acute pelvic pain status post fall





COMPARISON: CT abdomen and pelvis 12/9/2017





TECHNIQUE: AP view of the pelvis





FINDINGS: 


The bones appear mildly demineralized. There are moderate degenerative changes


of the bilateral femoral acetabular joints. Bilateral proximal femora appear


intact. No pelvic ring fracture identified. Sacrum is predominantly obscured by


bowel gas.





IMPRESSION: No acute fracture identified. 











The above report was generated using voice recognition software. It may contain


grammatical, syntax or spelling errors.











Electronically signed by:  Cale Washington M.D.


2/8/2018 1:42 PM





Dictated Date/Time:  2/8/2018 1:40 PM








L HUMERUS MIN 2 VIEWS ROUTINE





CLINICAL HISTORY: fall trauma





COMPARISON: None.





DISCUSSION: The bones and joint spaces appear intact. There is no evidence of


fracture, dislocation or bony disease. There is no evidence for soft tissue


swelling.





IMPRESSION: Negative study.

















The above report was generated using voice recognition software.  It may contain


grammatical, syntax or spelling errors.











Electronically signed by:  Lavelle Holder M.D.


2/8/2018 1:40 PM





Dictated Date/Time:  2/8/2018 1:40 PM








CT HEAD WITHOUT CONTRAST (CT)





CLINICAL HISTORY: Head trauma. Fall. Pain.    





COMPARISON STUDY:  No previous studies for comparison.





TECHNIQUE:  Axial CT of the brain is performed from the vertex to the skull


base. IV contrast was not administered for this examination. A dose lowering


technique was utilized adhering to the principles of ALARA.


 





CT DOSE:    





FINDINGS:





No intra or extra-axial mass lesions are visualized. There is no CT evidence of


acute cortical infarction. There is no evidence of midline shift. There is no


acute  hemorrhage. No calvarial fractures are visualized. 


There are minor white matter hypodensities likely on a small vessel basis.


There is no evidence of pathologic ventricular dilatation.


There is no evidence of acute sinusitis





IMPRESSION: No acute intracranial findings











Electronically signed by:  Darryl Valenzuela M.D.


2/8/2018 2:33 PM





Dictated Date/Time:  2/8/2018 2:32 PM





CERVICAL SPINE W/O








CT DOSE: 970.31 mGy.cm





HISTORY: Trauma. Pain.  fall





TECHNIQUE: Multiaxial CT images of the cervical spine were performed and


reformatted in the sagittal and coronal plane without the use of contrast.  A


dose lowering technique was utilized adhering to the principles of ALARA.





COMPARISON:  None.





FINDINGS: Considerable degenerative change throughout the entire cervical


region. Considerable degenerative change posterior and lateral comments. No


acute bony abnormality. Mild muscular spasm or





IMPRESSION:  


Considerable degenerative change. No acute process. Potential minimal


infiltrative changes right upper lung.











The above report was generated using voice recognition software.  It may contain


grammatical, syntax or spelling errors.











Electronically signed by:  Lavelle Holder M.D.


2/8/2018 2:50 PM





Dictated Date/Time:  2/8/2018 2:39 PM








CT SCAN OF THE ABDOMEN AND PELVIS WITHOUT IV CONTRAST





CLINICAL HISTORY:   Fall.





COMPARISON STUDY:  Abdominal CT dated 12/9/2017.





TECHNIQUE: CT scan of the abdomen and pelvis is performed from the lung bases to


the proximal femora. Images are reviewed in the axial, sagittal, and coronal


planes. IV contrast was not administered for this examination as per the


referring clinician. Note that the examination was performed in suboptimal


fashion without IV contrast. A dose lowering technique was utilized adhering to


the principles of ALARA. The examination is degraded by streak artifact from the


arms which could not be elevated above the abdomen as well as by motion


artifact.





CT DOSE: 1086.23 mGy.cm





FINDINGS:





Lung bases: The heart is top normal in size and without pericardial effusion.


There is a trace right pleural effusion. The lung bases are otherwise clear


noting dependent atelectasis. There is a tiny hiatal hernia.





Liver: The unenhanced liver is normal in size, contour, and attenuation. There


is no intrahepatic biliary ductal dilatation.





Gallbladder: There are calcified gallstones versus gallbladder wall


calcification.





Spleen: Normal in size and attenuation.





Pancreas: The unenhanced pancreas is atrophic and grossly unremarkable.





Adrenal glands: Unremarkable.





Kidneys: The unenhanced kidneys demonstrate cortical atrophy and are without


hydronephrosis. There are no renal calculi identified. There is no evidence of


contour deforming renal mass lesion.





Abdominal vasculature: The abdominal aorta is normal in course and caliber


noting mild atherosclerotic calcification.





Bowel: There is mild to moderate colonic fecal retention. No bowel obstruction


is seen. The appendix is  well-visualized and normal.





Peritoneum: There is no intraperitoneal free air or abdominal ascites. A


fat-containing hernia is seen in the right pelvis on image #287.





Lymphadenopathy: None.





Pelvic viscera: The bladder is distended and grossly unremarkable. The uterus is


surgically absent. No adnexal lesion is seen.





Skeletal structures: The skeletal structures are osteopenic. There is mild


lumbosacral spondylosis. No fracture is identified. No lytic or blastic lesions


are seen.








IMPRESSION:





1. Suboptimal examination without IV contrast. The examination is also


compromised by streak and motion artifact.





2. There is no evidence of solid organ injury in the abdomen and pelvis on this


unenhanced examination.





3. Cholelithiasis versus gallbladder wall calcification.





4. Mild to moderate colonic fecal retention.





5. Trace right pleural effusion.





6. Additional findings as above.











Electronically signed by:  Godwin Mckoy M.D.


2/8/2018 2:49 PM





Dictated Date/Time:  2/8/2018 2:40 PM





Laboratory Results











Test


  2/8/18


13:00 2/8/18


13:11 2/8/18


13:40 2/8/18


14:00


 


Immature Granulocyte % (Auto) 0.3 %    


 


White Blood Count


  10.95 K/uL


(4.8-10.8) 


  


  


 


 


Red Blood Count


  4.37 M/uL


(4.2-5.4) 


  


  


 


 


Hemoglobin


  13.7 g/dL


(12.0-16.0) 


  


  


 


 


Hematocrit 41.3 % (37-47)    


 


Mean Corpuscular Volume


  94.5 fL


() 


  


  


 


 


Mean Corpuscular Hemoglobin


  31.4 pg


(25-34) 


  


  


 


 


Mean Corpuscular Hemoglobin


Concent 33.2 g/dl


(32-36) 


  


  


 


 


Platelet Count


  190 K/uL


(130-400) 


  


  


 


 


Mean Platelet Volume


  11.0 fL


(7.4-10.4) 


  


  


 


 


Neutrophils (%) (Auto) 87.3 %    


 


Lymphocytes (%) (Auto) 4.2 %    


 


Monocytes (%) (Auto) 8.1 %    


 


Eosinophils (%) (Auto) 0.0 %    


 


Basophils (%) (Auto) 0.1 %    


 


Neutrophils # (Auto)


  9.56 K/uL


(1.4-6.5) 


  


  


 


 


Lymphocytes # (Auto)


  0.46 K/uL


(1.2-3.4) 


  


  


 


 


Monocytes # (Auto)


  0.89 K/uL


(0.11-0.59) 


  


  


 


 


Eosinophils # (Auto)


  0.00 K/uL


(0-0.5) 


  


  


 


 


Basophils # (Auto)


  0.01 K/uL


(0-0.2) 


  


  


 


 


Immature Granulocyte # (Auto)


  0.03 K/uL


(0.00-0.02) 


  


  


 


 


Erythrocyte Sedimentation Rate


  40 mm/hr


(0-21) 


  


  


 


 


Prothrombin Time


  12.5 SECONDS


(9.0-12.0) 


  


  


 


 


Prothromb Time International


Ratio 1.2 (0.9-1.1) 


  


  


  


 


 


Activated Partial


Thromboplast Time 26.1 SECONDS


(21.0-31.0) 


  


  


 


 


Partial Thromboplastin Ratio 1.0    


 


Phosphorus Level


  3.2 mg/dl


(2.5-4.9) 


  


  


 


 


Creatine Kinase MB


  9.0 ng/ml


(0.5-3.6) 


  


  


 


 


Creatine Kinase MB Ratio 0.6 (0-3.0)    


 


C-Reactive Protein


  5.36 mg/dl


(0-0.29) 


  


  


 


 


Pro-B-Type Natriuretic Peptide


  54869 pg/ml


(0-1800) 


  


  


 


 


Lipase


  88 U/L


() 


  


  


 


 


Bedside Lactic Acid Venous


  


  2.35 mmol/L


(0.90-1.70) 


  


 


 


Bedside Troponin I


  


  


  0.220 ng/ml


(0-0.045) 


 


 


Influenza Type A Antigen


  


  


  


  POS for Influ


A (NEG)


 


Influenza Type B Antigen


  


  


  


  Neg for Influ


B (NEG)


 


Test


  2/8/18


14:45 2/8/18


15:13 


  


 


 


Urine Color DK YELLOW    


 


Urine Appearance CLEAR (CLEAR)    


 


Urine pH 5.5 (4.5-7.5)    


 


Urine Specific Gravity


  1.020


(1.000-1.030) 


  


  


 


 


Urine Protein 1+ (NEG)    


 


Urine Glucose (UA) 2+ (NEG)    


 


Urine Ketones NEG (NEG)    


 


Urine Occult Blood 2+ (NEG)    


 


Urine Nitrite NEG (NEG)    


 


Urine Bilirubin NEG (NEG)    


 


Urine Urobilinogen NEG (NEG)    


 


Urine Leukocyte Esterase NEG (NEG)    


 


Urine WBC (Auto) 1-5 /hpf (0-5)    


 


Urine RBC (Auto) 0-4 /hpf (0-4)    


 


Urine Hyaline Casts (Auto)


  10-30 /lpf


(0-5) 


  


  


 


 


Urine Epithelial Cells (Auto)


  10-20 /lpf


(0-5) 


  


  


 


 


Urine Bacteria (Auto) NEG (NEG)    


 


Venous Blood pH


  


  7.36


(7.36-7.41) 


  


 


 


Venous Blood Partial Pressure


CO2 


  57 mmHg


(38.0-50.0) 


  


 


 


Venous Blood Partial Pressure


O2 


  26 mmHg 


  


  


 


 


Venous Blood HCO3  32 mmol/L   


 


Venous Blood Oxygen Saturation  < 60.0 %   


 


Venous Blood Base Excess  4.5 mEq/L   


 


Carboxyhemoglobin  0.0 % [facility]   





Laboratory results per my review.





Medications Administered











 Medications


  (Trade)  Dose


 Ordered  Sig/Gaby


 Route  Start Time


 Stop Time Status Last Admin


Dose Admin


 


 Albuterol/


 Ipratropium


  (Duoneb)  3 ml  NOW  STAT


 INH  2/8/18 13:10


 2/8/18 13:12 DC 2/8/18 13:35


3 ML


 


 Levofloxacin


  (Levaquin / D5W)  750 mg  NOW  STAT


 IV  2/8/18 13:24


 2/8/18 13:25 DC 2/8/18 14:54


750 MG


 


 Sodium Chloride  1,000 ml @ 


 999 mls/hr  Q1H1M STAT


 IV  2/8/18 14:29


 2/8/18 15:29 DC 2/8/18 14:55


999 MLS/HR


 


 Oseltamivir


 Phosphate


  (Tamiflu Cap)  75 mg  NOW  STAT


 PO  2/8/18 14:44


 2/8/18 14:45 DC 2/8/18 14:54


75 MG


 


 Sodium Chloride  1,000 ml @ 


 80 mls/hr  [id number]


 IV  2/8/18 15:36


 2/9/18 16:35  2/9/18 09:22


80 MLS/HR











ECG


Indication:  weakness


Rate (beats per minute):  87


Rhythm:  sinus rhythm


Findings:  other (PVCs noted, PRWP)


Comparison ECG Date:  12/06/17


Change:  no significant change


Change:


Patient's EKG was interpreted by me.





ED Course


1309: The patient was evaluated in room B05. A complete history and physical 

examination were performed. 





1310: Ordered Duoneb 3 ml INH.





1324: Ordered Levofloxacin 750 mg IV.





1429: Ordered Sodium Chloride 1000 ml @ 999 mls/hr IV.





1444: Ordered Tamiflu Cap 75 mg PO.





1454: I reevaluated the patient and updated her family on her results.





1458: I discussed the patient's case with Dr. Sahu, [facility] Hospitalist. He 

understands the patient's case and agrees to accept the patient. The patient 

will be further evaluated.





Medical Decision


Prior records/ancillary studies reviewed.


Triage Nursing notes reviewed.


Additional history obtained from the family.





The patient's history was concerning for respiratory difficulties.





Differential diagnosis:


Etiologies such as infections, reactive airway disease, pneumonia, pneumothorax

, COPD, CHF, cardiac ischemia, pulmonary embolism, musculoskeletal, 

gastrointestinal, as well as others were entertained. 





The patient is an 82-year-old female who presented to emergency department for 

an evaluation after a fall. The patient was found on the floor by her family 

members. She had multiple abrasions and contusions to her body. She also had 

significant hypoxia was found have pneumonia on chest x-ray. The patient was 

treated with IV fluids broke a dilator therapy as well as antibiotics. She was 

also found have a positive flu swab. The patient had multiple medical issues. I 

discussed the patient's laboratory radiographic studies with her as well as her 

family members. The [facility] hospitalist group was consult did. The 

patient was reevaluated multiple times.





Head Trauma


GCS Score:  15





Medication Reconcilliation


Current Medication List:  was personally reviewed by me





Blood Pressure Screening


Patient's blood pressure:  Normal blood pressure





Consults


Time Called:  1455


Consulting Physician:  Dr. Sahu, [facility] Hospitalist


Returned Call:  [phone]


 I discussed the patient's case with Dr. Sahu, [facility] Hospitalist. He 

understands the patient's case and agrees to accept the patient. The patient 

will be further evaluated.





Impression





 Primary Impression:  


 Right upper lobe pneumonia


 Additional Impressions:  


 Hypoxia


 Rhabdomyolysis


 Multiple contusions


 Influenza


 Elevated troponin


 Abnormal liver function


 Acute kidney injury





Scribe Attestation


The scribe's documentation has been prepared under my direction and personally 

reviewed by me in its entirety. I confirm that the note above accurately 

reflects all work, treatment, procedures, and medical decision making performed 

by me.





Departure Information


Dispostion


Being Evaluated By Hospitalist





Referrals


No Doctor, Assigned (PCP)





Patient Instructions


My [facility]





Problem Qualifiers








 Primary Impression:  


 Right upper lobe pneumonia


 Pneumonia type:  due to unspecified organism  Qualified Codes:  J18.1 - Lobar 

pneumonia, unspecified organism


 Additional Impressions:  


 Rhabdomyolysis


 Rhabdomyolysis type:  traumatic  Encounter type:  initial encounter  Qualified 

Codes:  T79.6XXA - Traumatic ischemia of muscle, initial encounter

## 2018-02-08 NOTE — DIAGNOSTIC IMAGING REPORT
PELVIS 1 OR 2 VIEW ROUTINE



HISTORY:  82 years-old Female fall acute pelvic pain status post fall



COMPARISON: CT abdomen and pelvis 12/9/2017



TECHNIQUE: AP view of the pelvis



FINDINGS: 

The bones appear mildly demineralized. There are moderate degenerative changes

of the bilateral femoral acetabular joints. Bilateral proximal femora appear

intact. No pelvic ring fracture identified. Sacrum is predominantly obscured by

bowel gas.



IMPRESSION: No acute fracture identified. 







The above report was generated using voice recognition software. It may contain

grammatical, syntax or spelling errors.







Electronically signed by:  Cale Washington M.D.

2/8/2018 1:42 PM



Dictated Date/Time:  2/8/2018 1:40 PM

## 2018-02-08 NOTE — DIAGNOSTIC IMAGING REPORT
CT SCAN OF THE ABDOMEN AND PELVIS WITHOUT IV CONTRAST



CLINICAL HISTORY:   Fall.



COMPARISON STUDY:  Abdominal CT dated 12/9/2017.



TECHNIQUE: CT scan of the abdomen and pelvis is performed from the lung bases to

the proximal femora. Images are reviewed in the axial, sagittal, and coronal

planes. IV contrast was not administered for this examination as per the

referring clinician. Note that the examination was performed in suboptimal

fashion without IV contrast. A dose lowering technique was utilized adhering to

the principles of ALARA. The examination is degraded by streak artifact from the

arms which could not be elevated above the abdomen as well as by motion

artifact.



CT DOSE: 1086.23 mGy.cm



FINDINGS:



Lung bases: The heart is top normal in size and without pericardial effusion.

There is a trace right pleural effusion. The lung bases are otherwise clear

noting dependent atelectasis. There is a tiny hiatal hernia.



Liver: The unenhanced liver is normal in size, contour, and attenuation. There

is no intrahepatic biliary ductal dilatation.



Gallbladder: There are calcified gallstones versus gallbladder wall

calcification.



Spleen: Normal in size and attenuation.



Pancreas: The unenhanced pancreas is atrophic and grossly unremarkable.



Adrenal glands: Unremarkable.



Kidneys: The unenhanced kidneys demonstrate cortical atrophy and are without

hydronephrosis. There are no renal calculi identified. There is no evidence of

contour deforming renal mass lesion.



Abdominal vasculature: The abdominal aorta is normal in course and caliber

noting mild atherosclerotic calcification.



Bowel: There is mild to moderate colonic fecal retention. No bowel obstruction

is seen. The appendix is  well-visualized and normal.



Peritoneum: There is no intraperitoneal free air or abdominal ascites. A

fat-containing hernia is seen in the right pelvis on image #287.



Lymphadenopathy: None.



Pelvic viscera: The bladder is distended and grossly unremarkable. The uterus is

surgically absent. No adnexal lesion is seen.



Skeletal structures: The skeletal structures are osteopenic. There is mild

lumbosacral spondylosis. No fracture is identified. No lytic or blastic lesions

are seen.





IMPRESSION:



1. Suboptimal examination without IV contrast. The examination is also

compromised by streak and motion artifact.



2. There is no evidence of solid organ injury in the abdomen and pelvis on this

unenhanced examination.



3. Cholelithiasis versus gallbladder wall calcification.



4. Mild to moderate colonic fecal retention.



5. Trace right pleural effusion.



6. Additional findings as above.







Electronically signed by:  Godwin Mckoy M.D.

2/8/2018 2:49 PM



Dictated Date/Time:  2/8/2018 2:40 PM

## 2018-02-08 NOTE — DIAGNOSTIC IMAGING REPORT
L HUMERUS MIN 2 VIEWS ROUTINE



CLINICAL HISTORY: fall trauma



COMPARISON: None.



DISCUSSION: The bones and joint spaces appear intact. There is no evidence of

fracture, dislocation or bony disease. There is no evidence for soft tissue

swelling.



IMPRESSION: Negative study.











The above report was generated using voice recognition software.  It may contain

grammatical, syntax or spelling errors.







Electronically signed by:  Lavelle Holder M.D.

2/8/2018 1:40 PM



Dictated Date/Time:  2/8/2018 1:40 PM

## 2018-02-08 NOTE — DIAGNOSTIC IMAGING REPORT
CHEST ONE VIEW PORTABLE



HISTORY:  82 years-old Female shortness of breath acute shortness of breath



COMPARISON: Chest radiograph 12/6/2017



TECHNIQUE: Portable AP view of the chest



FINDINGS: 

Patient is mildly rotated to the right. Cardiac silhouette is again enlarged,

unchanged. There is no pneumothorax or large pleural effusion. Chronic blunting

of the costophrenic angles with unchanged linear subsegmental

atelectasis/scarring of the lung bases, left greater than right. Hazy

ill-defined opacity of the right upper lobe. Bones appear grossly intact.



IMPRESSION: 

1. Ill-defined hazy opacity of the right upper lobe is favored to reflect

composite artifact with patient rotation. Consider follow-up PA and lateral

views of the chest to further characterize if of further clinical concern.



2. Cardiomegaly without overt pulmonary edema.



3. Unchanged linear subsegmental bibasilar atelectasis/scarring.







The above report was generated using voice recognition software. It may contain

grammatical, syntax or spelling errors.







Electronically signed by:  Cale Washington M.D.

2/8/2018 1:33 PM



Dictated Date/Time:  2/8/2018 1:31 PM

## 2018-02-08 NOTE — DIAGNOSTIC IMAGING REPORT
CERVICAL SPINE W/O





CT DOSE: 970.31 mGy.cm



HISTORY: Trauma. Pain.  fall



TECHNIQUE: Multiaxial CT images of the cervical spine were performed and

reformatted in the sagittal and coronal plane without the use of contrast.  A

dose lowering technique was utilized adhering to the principles of ALARA.



COMPARISON:  None.



FINDINGS: Considerable degenerative change throughout the entire cervical

region. Considerable degenerative change posterior and lateral comments. No

acute bony abnormality. Mild muscular spasm or



IMPRESSION:  

Considerable degenerative change. No acute process. Potential minimal

infiltrative changes right upper lung.







The above report was generated using voice recognition software.  It may contain

grammatical, syntax or spelling errors.







Electronically signed by:  Lavelle Holder M.D.

2/8/2018 2:50 PM



Dictated Date/Time:  2/8/2018 2:39 PM

## 2018-02-09 VITALS — DIASTOLIC BLOOD PRESSURE: 83 MMHG | SYSTOLIC BLOOD PRESSURE: 153 MMHG | HEART RATE: 69 BPM

## 2018-02-09 VITALS
SYSTOLIC BLOOD PRESSURE: 169 MMHG | DIASTOLIC BLOOD PRESSURE: 95 MMHG | TEMPERATURE: 98.24 F | HEART RATE: 64 BPM | OXYGEN SATURATION: 96 %

## 2018-02-09 VITALS
SYSTOLIC BLOOD PRESSURE: 125 MMHG | DIASTOLIC BLOOD PRESSURE: 79 MMHG | HEART RATE: 65 BPM | OXYGEN SATURATION: 98 % | TEMPERATURE: 97.88 F

## 2018-02-09 VITALS
DIASTOLIC BLOOD PRESSURE: 69 MMHG | OXYGEN SATURATION: 97 % | SYSTOLIC BLOOD PRESSURE: 113 MMHG | TEMPERATURE: 98.06 F | HEART RATE: 66 BPM

## 2018-02-09 VITALS
SYSTOLIC BLOOD PRESSURE: 137 MMHG | OXYGEN SATURATION: 92 % | DIASTOLIC BLOOD PRESSURE: 80 MMHG | HEART RATE: 77 BPM | TEMPERATURE: 97.7 F

## 2018-02-09 VITALS — OXYGEN SATURATION: 98 %

## 2018-02-09 VITALS — SYSTOLIC BLOOD PRESSURE: 164 MMHG | DIASTOLIC BLOOD PRESSURE: 86 MMHG | HEART RATE: 71 BPM

## 2018-02-09 VITALS
DIASTOLIC BLOOD PRESSURE: 81 MMHG | TEMPERATURE: 98.24 F | SYSTOLIC BLOOD PRESSURE: 137 MMHG | OXYGEN SATURATION: 95 % | HEART RATE: 63 BPM

## 2018-02-09 VITALS — OXYGEN SATURATION: 91 %

## 2018-02-09 VITALS
TEMPERATURE: 97.52 F | SYSTOLIC BLOOD PRESSURE: 177 MMHG | HEART RATE: 72 BPM | DIASTOLIC BLOOD PRESSURE: 93 MMHG | OXYGEN SATURATION: 95 %

## 2018-02-09 VITALS — OXYGEN SATURATION: 95 % | HEART RATE: 74 BPM

## 2018-02-09 VITALS — OXYGEN SATURATION: 95 % | HEART RATE: 61 BPM

## 2018-02-09 VITALS — HEART RATE: 62 BPM | OXYGEN SATURATION: 95 %

## 2018-02-09 VITALS
DIASTOLIC BLOOD PRESSURE: 66 MMHG | HEART RATE: 62 BPM | TEMPERATURE: 98.24 F | SYSTOLIC BLOOD PRESSURE: 104 MMHG | OXYGEN SATURATION: 91 %

## 2018-02-09 LAB
ALBUMIN SERPL-MCNC: 2.5 GM/DL (ref 3.4–5)
ALP SERPL-CCNC: 77 U/L (ref 45–117)
ALT SERPL-CCNC: 312 U/L (ref 12–78)
AST SERPL-CCNC: 398 U/L (ref 15–37)
BUN SERPL-MCNC: 23 MG/DL (ref 7–18)
CALCIUM SERPL-MCNC: 8.7 MG/DL (ref 8.5–10.1)
CO2 SERPL-SCNC: 28 MMOL/L (ref 21–32)
CREAT SERPL-MCNC: 1.16 MG/DL (ref 0.6–1.2)
EOSINOPHIL NFR BLD AUTO: 145 K/UL (ref 130–400)
GLUCOSE SERPL-MCNC: 187 MG/DL (ref 70–99)
HBA1C MFR BLD: 8.2 % (ref 4.5–5.6)
HCT VFR BLD CALC: 35.5 % (ref 37–47)
HGB BLD-MCNC: 11.6 G/DL (ref 12–16)
MCH RBC QN AUTO: 30.9 PG (ref 25–34)
MCHC RBC AUTO-ENTMCNC: 32.7 G/DL (ref 32–36)
MCV RBC AUTO: 94.7 FL (ref 80–100)
PMV BLD AUTO: 10.8 FL (ref 7.4–10.4)
POTASSIUM SERPL-SCNC: 3.7 MMOL/L (ref 3.5–5.1)
PROT SERPL-MCNC: 6.2 GM/DL (ref 6.4–8.2)
RED CELL DISTRIBUTION WIDTH CV: 17 % (ref 11.5–14.5)
RED CELL DISTRIBUTION WIDTH SD: 59.1 FL (ref 36.4–46.3)
SODIUM SERPL-SCNC: 138 MMOL/L (ref 136–145)
WBC # BLD AUTO: 8.21 K/UL (ref 4.8–10.8)

## 2018-02-09 RX ADMIN — SODIUM CHLORIDE SCH MLS/HR: 900 INJECTION, SOLUTION INTRAVENOUS at 11:00

## 2018-02-09 RX ADMIN — OSELTAMIVIR PHOSPHATE SCH MG: 6 POWDER, FOR SUSPENSION ORAL at 20:48

## 2018-02-09 RX ADMIN — INSULIN ASPART SCH UNITS: 100 INJECTION, SOLUTION INTRAVENOUS; SUBCUTANEOUS at 17:24

## 2018-02-09 RX ADMIN — FERROUS SULFATE TAB EC 325 MG (65 MG FE EQUIVALENT) SCH MG: 325 (65 FE) TABLET DELAYED RESPONSE at 10:05

## 2018-02-09 RX ADMIN — ALUMINUM ZIRCONIUM TRICHLOROHYDREX GLY SCH EA: 0.2 STICK TOPICAL at 08:00

## 2018-02-09 RX ADMIN — ALUMINUM ZIRCONIUM TRICHLOROHYDREX GLY SCH EA: 0.2 STICK TOPICAL at 15:30

## 2018-02-09 RX ADMIN — IPRATROPIUM BROMIDE SCH MG: 0.5 SOLUTION RESPIRATORY (INHALATION) at 14:24

## 2018-02-09 RX ADMIN — ALUMINUM ZIRCONIUM TRICHLOROHYDREX GLY SCH EA: 0.2 STICK TOPICAL at 23:26

## 2018-02-09 RX ADMIN — OSELTAMIVIR PHOSPHATE SCH MG: 6 POWDER, FOR SUSPENSION ORAL at 10:04

## 2018-02-09 RX ADMIN — INSULIN ASPART SCH UNITS: 100 INJECTION, SOLUTION INTRAVENOUS; SUBCUTANEOUS at 07:00

## 2018-02-09 RX ADMIN — IPRATROPIUM BROMIDE SCH MG: 0.5 SOLUTION RESPIRATORY (INHALATION) at 01:46

## 2018-02-09 RX ADMIN — ALUMINUM ZIRCONIUM TRICHLOROHYDREX GLY SCH EA: 0.2 STICK TOPICAL at 00:00

## 2018-02-09 RX ADMIN — CARVEDILOL SCH MG: 6.25 TABLET, FILM COATED ORAL at 20:36

## 2018-02-09 RX ADMIN — INSULIN ASPART SCH UNITS: 100 INJECTION, SOLUTION INTRAVENOUS; SUBCUTANEOUS at 11:58

## 2018-02-09 RX ADMIN — LEVALBUTEROL SCH MG: 1.25 SOLUTION, CONCENTRATE RESPIRATORY (INHALATION) at 14:24

## 2018-02-09 RX ADMIN — LEVALBUTEROL SCH MG: 1.25 SOLUTION, CONCENTRATE RESPIRATORY (INHALATION) at 01:46

## 2018-02-09 RX ADMIN — INSULIN ASPART SCH UNITS: 100 INJECTION, SOLUTION INTRAVENOUS; SUBCUTANEOUS at 20:38

## 2018-02-09 RX ADMIN — LEVALBUTEROL SCH MG: 1.25 SOLUTION, CONCENTRATE RESPIRATORY (INHALATION) at 19:25

## 2018-02-09 RX ADMIN — SODIUM CHLORIDE SCH MLS/HR: 900 INJECTION, SOLUTION INTRAVENOUS at 22:34

## 2018-02-09 RX ADMIN — CARVEDILOL SCH MG: 6.25 TABLET, FILM COATED ORAL at 10:05

## 2018-02-09 RX ADMIN — SODIUM CHLORIDE SCH MLS/HR: 900 INJECTION, SOLUTION INTRAVENOUS at 09:22

## 2018-02-09 RX ADMIN — LEVOTHYROXINE SODIUM SCH MCG: 112 TABLET ORAL at 05:27

## 2018-02-09 RX ADMIN — IPRATROPIUM BROMIDE SCH MG: 0.5 SOLUTION RESPIRATORY (INHALATION) at 19:25

## 2018-02-09 NOTE — DIAGNOSTIC IMAGING REPORT
CHEST ONE VIEW PORTABLE



CLINICAL HISTORY: F/U for possible Infiltrate ABNORMAL CHEST X-RAY



COMPARISON STUDY:  February 8, 2018



FINDINGS: The heart is enlarged. There are small bilateral pleural effusions.

There is radiographic evidence of pulmonary venous hypertension. There are

resolving right upper lobe airspace opacities there are mild basilar atelectatic

changes.[ 



IMPRESSION: 

1. Cardiomegaly, small bilateral pleural effusions, and suspected pulmonary

venous hypertension

2. Resolving right upper lobe airspace opacities







Electronically signed by:  Darryl Valenzuela M.D.

2/9/2018 7:17 AM



Dictated Date/Time:  2/9/2018 7:15 AM

## 2018-02-09 NOTE — HOSPITALIST PROGRESS NOTE
Hospitalist Progress Note


Date of Service


Feb 9, 2018.





Subjective


Pt evaluation today including:  conversation w/ patient, physical exam, chart 

review, lab review, review of studies, review of inpatient medication list


Pain:  None


PO Intake:  Tolerating PO diet


Voiding:  no voiding problems


Patient reports feeling much better.  She denies any shortness of breath 

currently.  She did get up to the bathroom with a walker and assistance and 

denied any dyspnea on exertion with this.  She states she does have oxygen at 

home to use as needed, but she does not typically have to wear it.  She does 

not note any wheezing today.  She does complain of a productive cough.  She 

does states that she still feels weak in her legs following her fall, 

especially in her thighs.  She states she was stuck on the floor on her abdomen 

for several hours following her fall.  The patient denies fevers, chills, sweats

, chest pain, palpitations, claudication, wheezing, shortness of breath, nausea

, vomiting, abdominal pain, dysuria, hematuria, urinary retention, paralysis, 

weakness, numbness and tingling.





   Additional Comments:


See HPI for pertinent positives and negatives.  All other systems reviewed and 

negative.





Objective


Vital Signs











  Date Time  Temp Pulse Resp B/P (MAP) Pulse Ox O2 Delivery O2 Flow Rate FiO2


 


2/9/18 07:30 36.5 77 22 137/80 (99) 92 Nasal Cannula 3.0 


 


2/9/18 04:00     98 Nasal Cannula 3.0 


 


2/9/18 03:21 36.8 62 18 104/66 (79) 91   


 


2/9/18 01:46  61 16  95 Nasal Cannula 3.0 


 


2/9/18 00:08 36.6 65 16 125/79 (94) 98   


 


2/9/18 00:00     98 Nasal Cannula 4.0 


 


2/8/18 21:16  73  123/69 (87)    


 


2/8/18 20:00     96 Nasal Cannula 4.0 


 


2/8/18 19:01  68 16  90 Nasal Cannula 3.0 


 


2/8/18 18:05 36.7 79 20 104/59 96 Nasal Cannula 4.0 


 


2/8/18 17:00  72 20 143/86 97 Room Air 2.0 


 


2/8/18 16:00  75 20 116/69 97 Nasal Cannula 3.0 


 


2/8/18 14:56  81  134/98    


 


2/8/18 14:52  82 18  97   


 


2/8/18 14:46    190/91    





    190/91    


 


2/8/18 14:37  84 16  96   


 


2/8/18 14:33    167/90    





    167/90    


 


2/8/18 14:02  80 20  100   


 


2/8/18 13:47  78 19  100   


 


2/8/18 13:32  86 24  97   


 


2/8/18 13:31    182/101    


 


2/8/18 13:25 36.4 86 20 119/89 100 Nasal Cannula 3.0 


 


2/8/18 13:25     100 Nasal Cannula 3.0 


 


2/8/18 13:17  85 23  98   


 


2/8/18 13:16    167/104    


 


2/8/18 13:07    119/89    


 


2/8/18 13:07  86      


 


2/8/18 13:05    167/153    


 


2/8/18 12:59     100 Nasal Cannula 3.0 


 


2/8/18 12:55 36.4 86 20 119/89 85 Room Air  


 


2/8/18 12:55     85 Room Air  











Physical Exam


Notes:


General appearance:  +Obese.  Well-developed, well-nourished, no apparent 

distress


Head:  Normocephalic, atraumatic


Eyes:  Normal inspection, PERRL, EOMI


ENT:  Normal ENT inspection, hearing grossly normal, pharynx normal


Neck:  Supple, no JVD, trachea midline


Respiratory/Chest:  +Decreased breath sounds, wheezing throughout.  Currently 

on 3L NC.  No respiratory distress


Cardiovascular:  Regular rate & rhythm, no gallop, no murmur


Abdomen/GI:  Normal bowel sounds, non-tender, soft


Extremities/Musculoskeletal:  Normal inspection, no calf tenderness, no pedal 

edema


Neurological/Psych:  Alert, normal mood/affect, oriented x 3


Skin:  +Scattered ecchymoses.  Normal color, warm/dry, no rash





Laboratory Results





Last 24 Hours








Test


  2/8/18


13:00 2/8/18


13:11 2/8/18


13:40 2/8/18


14:00


 


White Blood Count 10.95 K/uL    


 


Red Blood Count 4.37 M/uL    


 


Hemoglobin 13.7 g/dL    


 


Hematocrit 41.3 %    


 


Mean Corpuscular Volume 94.5 fL    


 


Mean Corpuscular Hemoglobin 31.4 pg    


 


Mean Corpuscular Hemoglobin


Concent 33.2 g/dl 


  


  


  


 


 


Platelet Count 190 K/uL    


 


Mean Platelet Volume 11.0 fL    


 


Neutrophils (%) (Auto) 87.3 %    


 


Lymphocytes (%) (Auto) 4.2 %    


 


Monocytes (%) (Auto) 8.1 %    


 


Eosinophils (%) (Auto) 0.0 %    


 


Basophils (%) (Auto) 0.1 %    


 


Neutrophils # (Auto) 9.56 K/uL    


 


Lymphocytes # (Auto) 0.46 K/uL    


 


Monocytes # (Auto) 0.89 K/uL    


 


Eosinophils # (Auto) 0.00 K/uL    


 


Basophils # (Auto) 0.01 K/uL    


 


RDW Standard Deviation 57.8 fL    


 


RDW Coefficient of Variation 16.6 %    


 


Immature Granulocyte % (Auto) 0.3 %    


 


Immature Granulocyte # (Auto) 0.03 K/uL    


 


Erythrocyte Sedimentation Rate 40 mm/hr    


 


Prothrombin Time 12.5 SECONDS    


 


Prothromb Time International


Ratio 1.2 


  


  


  


 


 


Activated Partial


Thromboplast Time 26.1 SECONDS 


  


  


  


 


 


Partial Thromboplastin Ratio 1.0    


 


Sodium Level 133 mmol/L    


 


Potassium Level 3.3 mmol/L    


 


Chloride Level 97 mmol/L    


 


Carbon Dioxide Level 30 mmol/L    


 


Anion Gap 7.0 mmol/L    


 


Blood Urea Nitrogen 20 mg/dl    


 


Creatinine 1.25 mg/dl    


 


Est Creatinine Clear Calc


Drug Dose 38.8 ml/min 


  


  


  


 


 


Estimated GFR (


American) 46.4 


  


  


  


 


 


Estimated GFR (Non-


American 40.0 


  


  


  


 


 


BUN/Creatinine Ratio 16.2    


 


Random Glucose 281 mg/dl    


 


Calcium Level 9.4 mg/dl    


 


Phosphorus Level 3.2 mg/dl    


 


Magnesium Level 2.2 mg/dl    


 


Total Bilirubin 1.3 mg/dl    


 


Aspartate Amino Transf


(AST/SGOT) 335 U/L 


  


  


  


 


 


Alanine Aminotransferase


(ALT/SGPT) 232 U/L 


  


  


  


 


 


Alkaline Phosphatase 108 U/L    


 


Total Creatine Kinase 1619 U/L    


 


Creatine Kinase MB 9.0 ng/ml    


 


Creatine Kinase MB Ratio 0.6    


 


Troponin I 0.282 ng/ml    


 


C-Reactive Protein 5.36 mg/dl    


 


Pro-B-Type Natriuretic Peptide 17909 pg/ml    


 


Total Protein 8.2 gm/dl    


 


Albumin 3.5 gm/dl    


 


Globulin 4.7 gm/dl    


 


Albumin/Globulin Ratio 0.7    


 


Lipase 88 U/L    


 


Bedside Lactic Acid Venous  2.35 mmol/L   


 


Bedside Troponin I   0.220 ng/ml  


 


Influenza Type A Antigen


  


  


  


  POS for Influ


A


 


Influenza Type B Antigen


  


  


  


  Neg for Influ


B


 


Test


  2/8/18


14:45 2/8/18


15:13 2/8/18


19:10 2/8/18


20:01


 


Urine Color DK YELLOW    


 


Urine Appearance CLEAR    


 


Urine pH 5.5    


 


Urine Specific Gravity 1.020    


 


Urine Protein 1+    


 


Urine Glucose (UA) 2+    


 


Urine Ketones NEG    


 


Urine Occult Blood 2+    


 


Urine Nitrite NEG    


 


Urine Bilirubin NEG    


 


Urine Urobilinogen NEG    


 


Urine Leukocyte Esterase NEG    


 


Urine WBC (Auto) 1-5 /hpf    


 


Urine RBC (Auto) 0-4 /hpf    


 


Urine Hyaline Casts (Auto) 10-30 /lpf    


 


Urine Epithelial Cells (Auto) 10-20 /lpf    


 


Urine Bacteria (Auto) NEG    


 


Venous Blood pH  7.36   


 


Venous Blood Partial Pressure


CO2 


  57 mmHg 


  


  


 


 


Venous Blood Partial Pressure


O2 


  26 mmHg 


  


  


 


 


Venous Blood HCO3  32 mmol/L   


 


Venous Blood Oxygen Saturation  < 60.0 %   


 


Venous Blood Base Excess  4.5 mEq/L   


 


Carboxyhemoglobin  0.0 % TH   


 


Troponin I   0.659 ng/ml  


 


Bedside Glucose    190 mg/dl 


 


Test


  2/9/18


01:00 2/9/18


06:57 


  


 


 


Troponin I 0.525 ng/ml  0.345 ng/ml   


 


White Blood Count  8.21 K/uL   


 


Red Blood Count  3.75 M/uL   


 


Hemoglobin  11.6 g/dL   


 


Hematocrit  35.5 %   


 


Mean Corpuscular Volume  94.7 fL   


 


Mean Corpuscular Hemoglobin  30.9 pg   


 


Mean Corpuscular Hemoglobin


Concent 


  32.7 g/dl 


  


  


 


 


RDW Standard Deviation  59.1 fL   


 


RDW Coefficient of Variation  17.0 %   


 


Platelet Count  145 K/uL   


 


Mean Platelet Volume  10.8 fL   


 


Sodium Level  138 mmol/L   


 


Potassium Level  3.7 mmol/L   


 


Chloride Level  103 mmol/L   


 


Carbon Dioxide Level  28 mmol/L   


 


Anion Gap  7.0 mmol/L   


 


Blood Urea Nitrogen  23 mg/dl   


 


Creatinine  1.16 mg/dl   


 


Est Creatinine Clear Calc


Drug Dose 


  41.1 ml/min 


  


  


 


 


Estimated GFR (


American) 


  50.8 


  


  


 


 


Estimated GFR (Non-


American 


  43.8 


  


  


 


 


BUN/Creatinine Ratio  20.2   


 


Random Glucose  187 mg/dl   


 


Calcium Level  8.7 mg/dl   


 


Magnesium Level  2.2 mg/dl   


 


Total Bilirubin  0.5 mg/dl   


 


Direct Bilirubin  0.2 mg/dl   


 


Aspartate Amino Transf


(AST/SGOT) 


  398 U/L 


  


  


 


 


Alanine Aminotransferase


(ALT/SGPT) 


  312 U/L 


  


  


 


 


Alkaline Phosphatase  77 U/L   


 


Total Creatine Kinase  1382 U/L   


 


Total Protein  6.2 gm/dl   


 


Albumin  2.5 gm/dl   


 


Globulin  3.7 gm/dl   


 


Albumin/Globulin Ratio  0.7   











Diagnostic Results


Reviewed the following studies and agree with interpretation as follows:





CHEST ONE VIEW PORTABLE





CLINICAL HISTORY: F/U for possible Infiltrate ABNORMAL CHEST X-RAY





COMPARISON STUDY:  February 8, 2018





FINDINGS: The heart is enlarged. There are small bilateral pleural effusions.


There is radiographic evidence of pulmonary venous hypertension. There are


resolving right upper lobe airspace opacities there are mild basilar atelectatic


changes.[ 





IMPRESSION: 


1. Cardiomegaly, small bilateral pleural effusions, and suspected pulmonary


venous hypertension


2. Resolving right upper lobe airspace opacities





Assessment and Plan


83 y/o female with a history of diastolic CHF, paroxysmal a-fib, HTN, COPD, 

severe restrictive lung disease, DM II, hypothyroidism and chronic gastritis 

who presents with acute respiratory and wheezing as well as a fall.





Acute Hypoxic Respiratory Failure due to Influenza A present on admission and 

COPD Exacerbation/Severe Restrictive Lung Disease--improving


- Admit to telemetry.  No acute events overnight. Pt in sinus rhythm with HR in 

60-80s.  Stable, troponin trending down, will transfer to medical


- O2 by protocol, wean as tolerated to SpO2 88-92%.  Currently on 3L NC.


- Continue Tamiflu 30 mg BID for renal dosing x 5 days.  Day #2 of 5.


- Xopenex/Atrovent nebs


- Prednisone 40 mg qd.  Day #2





Possible CAP--likely artifact, already resolving on repeat CXR


- CXR 2/9 shows resolving right upper lobe opacities.  


- No further antibiotics.  No pneumonia symptoms, afebrile, no leukocytosis





Mild Rhabdomyolysis secondary to Mechanical Fall--on floor for 12 hours.  

Improving


- CK 1382 on 2/9, down from 1619.  Received 2L NSS at 80 cc/hr


- Continue NSS at 75 cc/hr for now, continue to monitor fluid status given h/o 

CHF





Elevated troponin--resolving, likely secondary to demand ischemia


-Troponin peaked at 0.659, trending down.  No chest pain





QT Prolongation--improving.  Did receive dose of Levaquin in ED


- Per EKG, QTc now 509





Chronic Diastolic CHF, HTN--stable, does not appear fluid overloaded at this 

time


- Continue Coreg 6.25 mg PO BID


- Hold Losartan and Lasix for now due to rhabdo





Paroxysmal Atrial Fibrillation--NSR on tele


- H/o amiodarone use, d/c'd due to toxicity.  H/o of AC use but h/o acute 

anemia which is now stable since off AC.  Believed to have GI bleeding





DM II--last HgbA1c 7.4 on 10/6/17


- Hold Glipizide


- Insulin sliding scale


- Check BSGs q ac and qhs


- Recheck HgbA1c





Hypothyroidism


-Continue Synthroid 112 mcg PO qd





Code Status


-Level I, FULL RESUSCITATION STATUS





DVT prophylaxis


-Hold chemical ppx due to h/o GIB


-SCDs





Disposition:


- PT/OT evaluations - lives with her  who is currently here for 

Influenza A and ambulatory dysfunction with plans to go to rehab

## 2018-02-10 VITALS
HEART RATE: 60 BPM | DIASTOLIC BLOOD PRESSURE: 93 MMHG | OXYGEN SATURATION: 97 % | SYSTOLIC BLOOD PRESSURE: 180 MMHG | TEMPERATURE: 98.06 F

## 2018-02-10 VITALS — HEART RATE: 69 BPM | OXYGEN SATURATION: 97 %

## 2018-02-10 VITALS — OXYGEN SATURATION: 95 %

## 2018-02-10 VITALS
TEMPERATURE: 97.88 F | OXYGEN SATURATION: 94 % | HEART RATE: 68 BPM | SYSTOLIC BLOOD PRESSURE: 186 MMHG | DIASTOLIC BLOOD PRESSURE: 84 MMHG

## 2018-02-10 VITALS — HEART RATE: 61 BPM | DIASTOLIC BLOOD PRESSURE: 80 MMHG | SYSTOLIC BLOOD PRESSURE: 167 MMHG

## 2018-02-10 VITALS — OXYGEN SATURATION: 97 % | HEART RATE: 62 BPM

## 2018-02-10 VITALS — OXYGEN SATURATION: 99 % | HEART RATE: 77 BPM

## 2018-02-10 VITALS — DIASTOLIC BLOOD PRESSURE: 82 MMHG | SYSTOLIC BLOOD PRESSURE: 155 MMHG | HEART RATE: 64 BPM

## 2018-02-10 VITALS — HEART RATE: 74 BPM | OXYGEN SATURATION: 99 %

## 2018-02-10 LAB
ALBUMIN SERPL-MCNC: 2.8 GM/DL (ref 3.4–5)
ALP SERPL-CCNC: 82 U/L (ref 45–117)
ALT SERPL-CCNC: 274 U/L (ref 12–78)
AST SERPL-CCNC: 239 U/L (ref 15–37)
BUN SERPL-MCNC: 31 MG/DL (ref 7–18)
CALCIUM SERPL-MCNC: 8.8 MG/DL (ref 8.5–10.1)
CO2 SERPL-SCNC: 28 MMOL/L (ref 21–32)
CREAT SERPL-MCNC: 1.23 MG/DL (ref 0.6–1.2)
EOSINOPHIL NFR BLD AUTO: 167 K/UL (ref 130–400)
GLUCOSE SERPL-MCNC: 175 MG/DL (ref 70–99)
HCT VFR BLD CALC: 35.6 % (ref 37–47)
HGB BLD-MCNC: 11.5 G/DL (ref 12–16)
MCH RBC QN AUTO: 30.5 PG (ref 25–34)
MCHC RBC AUTO-ENTMCNC: 32.3 G/DL (ref 32–36)
MCV RBC AUTO: 94.4 FL (ref 80–100)
PMV BLD AUTO: 10.8 FL (ref 7.4–10.4)
POTASSIUM SERPL-SCNC: 3.9 MMOL/L (ref 3.5–5.1)
PROT SERPL-MCNC: 6.5 GM/DL (ref 6.4–8.2)
RED CELL DISTRIBUTION WIDTH CV: 16.9 % (ref 11.5–14.5)
RED CELL DISTRIBUTION WIDTH SD: 58.5 FL (ref 36.4–46.3)
SODIUM SERPL-SCNC: 138 MMOL/L (ref 136–145)
WBC # BLD AUTO: 9.27 K/UL (ref 4.8–10.8)

## 2018-02-10 RX ADMIN — OSELTAMIVIR PHOSPHATE SCH MG: 6 POWDER, FOR SUSPENSION ORAL at 08:14

## 2018-02-10 RX ADMIN — INSULIN ASPART SCH UNITS: 100 INJECTION, SOLUTION INTRAVENOUS; SUBCUTANEOUS at 06:30

## 2018-02-10 RX ADMIN — INSULIN ASPART SCH UNITS: 100 INJECTION, SOLUTION INTRAVENOUS; SUBCUTANEOUS at 17:28

## 2018-02-10 RX ADMIN — OSELTAMIVIR PHOSPHATE SCH MG: 6 POWDER, FOR SUSPENSION ORAL at 20:29

## 2018-02-10 RX ADMIN — LOSARTAN POTASSIUM SCH MG: 50 TABLET, FILM COATED ORAL at 09:24

## 2018-02-10 RX ADMIN — LEVALBUTEROL SCH MG: 1.25 SOLUTION, CONCENTRATE RESPIRATORY (INHALATION) at 14:59

## 2018-02-10 RX ADMIN — IPRATROPIUM BROMIDE SCH MG: 0.5 SOLUTION RESPIRATORY (INHALATION) at 01:49

## 2018-02-10 RX ADMIN — INSULIN ASPART SCH UNITS: 100 INJECTION, SOLUTION INTRAVENOUS; SUBCUTANEOUS at 20:33

## 2018-02-10 RX ADMIN — INSULIN ASPART SCH UNITS: 100 INJECTION, SOLUTION INTRAVENOUS; SUBCUTANEOUS at 11:00

## 2018-02-10 RX ADMIN — ALUMINUM ZIRCONIUM TRICHLOROHYDREX GLY SCH EA: 0.2 STICK TOPICAL at 07:14

## 2018-02-10 RX ADMIN — LEVOTHYROXINE SODIUM SCH MCG: 112 TABLET ORAL at 06:36

## 2018-02-10 RX ADMIN — ALUMINUM ZIRCONIUM TRICHLOROHYDREX GLY SCH EA: 0.2 STICK TOPICAL at 15:49

## 2018-02-10 RX ADMIN — CARVEDILOL SCH MG: 6.25 TABLET, FILM COATED ORAL at 20:24

## 2018-02-10 RX ADMIN — FUROSEMIDE SCH MG: 80 TABLET ORAL at 09:24

## 2018-02-10 RX ADMIN — IPRATROPIUM BROMIDE SCH MG: 0.5 SOLUTION RESPIRATORY (INHALATION) at 19:42

## 2018-02-10 RX ADMIN — IPRATROPIUM BROMIDE SCH MG: 0.5 SOLUTION RESPIRATORY (INHALATION) at 07:18

## 2018-02-10 RX ADMIN — LEVALBUTEROL SCH MG: 1.25 SOLUTION, CONCENTRATE RESPIRATORY (INHALATION) at 07:18

## 2018-02-10 RX ADMIN — LEVALBUTEROL SCH MG: 1.25 SOLUTION, CONCENTRATE RESPIRATORY (INHALATION) at 19:42

## 2018-02-10 RX ADMIN — LEVALBUTEROL SCH MG: 1.25 SOLUTION, CONCENTRATE RESPIRATORY (INHALATION) at 01:49

## 2018-02-10 RX ADMIN — ALUMINUM ZIRCONIUM TRICHLOROHYDREX GLY SCH EA: 0.2 STICK TOPICAL at 23:37

## 2018-02-10 RX ADMIN — IPRATROPIUM BROMIDE SCH MG: 0.5 SOLUTION RESPIRATORY (INHALATION) at 14:59

## 2018-02-10 RX ADMIN — CARVEDILOL SCH MG: 6.25 TABLET, FILM COATED ORAL at 08:14

## 2018-02-10 RX ADMIN — FERROUS SULFATE TAB EC 325 MG (65 MG FE EQUIVALENT) SCH MG: 325 (65 FE) TABLET DELAYED RESPONSE at 08:15

## 2018-02-10 NOTE — PROGRESS NOTE
Subjective


Date of Service:


Feb 10, 2018.


Subjective


Pt evaluation today including:  conversation w/ patient, physical exam, lab 

review, review of inpatient medication list


Pain:  no pain


PO Intake:  adequate


Voiding:  no voiding problems


patient doing well today


eating well, no pain





reviewed labs, Cr stable, AST and ALT trending down





Problem List


Medical Problems:


(1) Abnormal liver function


Status: Acute  





(2) Acute kidney injury


Status: Acute  





(3) Atrial fibrillation with RVR


Status: Acute  





(4) Back pain


Status: Acute  





(5) Back pain


Status: Acute  





(6) Bradycardia


Status: Acute  





(7) CHF (congestive heart failure)


Status: Acute  





(8) CHF (congestive heart failure)


Status: Acute  





(9) CHF (congestive heart failure)


Status: Acute  





(10) CHF exacerbation


Status: Acute  





(11) EKG abnormalities


Status: Acute  





(12) Elevated troponin


Status: Acute  





(13) Hypoxia


Status: Acute  





(14) Influenza


Status: Acute  





(15) Multiple contusions


Status: Acute  





(16) New onset atrial fibrillation


Status: Acute  





(17) Rhabdomyolysis


Status: Acute  





(18) Right upper lobe pneumonia


Status: Acute  





(19) Shortness of breath


Status: Acute  











Review of Systems


Constitutional:  + weakness, + fatigue


Respiratory:  + cough, + shortness of breath


All Other Systems:  Reviewed and Negative





Medications





Current Inpatient Medications








 Medications


  (Trade)  Dose


 Ordered  Sig/Gaby


 Route  Start Time


 Stop Time Status Last Admin


Dose Admin


 


 Acetaminophen


  (Tylenol Tab)  650 mg  Q4H  PRN


 PO  2/8/18 15:45


 3/10/18 15:44   


 


 


 Al Hydrox/Mg


 Hydrox/Simethicone


  (Maalox Max Susp)  15 ml  Q4H  PRN


 PO  2/8/18 15:45


 3/10/18 15:44   


 


 


 Magnesium


 Hydroxide


  (Milk Of


 Magnesia Susp)  30 ml  Q12H  PRN


 PO  2/8/18 15:45


 3/10/18 15:44   


 


 


 Ondansetron HCl


  (Zofran Inj)  4 mg  Q6H  PRN


 IV  2/8/18 15:45


 3/10/18 15:44   


 


 


 Polyethylene


  (Miralax Powder


 Packet)  17 gm  DAILY  PRN


 PO  2/8/18 15:45


 3/10/18 15:44   


 


 


 Insulin Aspart


  (novoLOG ASPART)  **SLIDING


 SCALE**


 **If C...  ACHS


 SC  2/8/18 16:00


 3/10/18 15:59  2/9/18 20:38


3 UNITS


 


 Glucose


  (Glucose 40% Gel)  15-30


 GRAMS 15


 GRAMS...  UD  PRN


 PO  2/8/18 15:45


 3/10/18 15:44   


 


 


 Glucose


  (Glucose Chew


 Tab)  4-8


 Tablets 4


 Tabl...  UD  PRN


 PO  2/8/18 15:45


 3/10/18 15:44   


 


 


 Dextrose


  (Dextrose 50%


 50ML Syringe)  25-50ML OF


 50% DW IV


 FOR...  UD  PRN


 IV  2/8/18 15:45


 3/10/18 15:44   


 


 


 Glucagon


  (Glucagon Inj)  1 mg  UD  PRN


 SQ  2/8/18 15:45


 3/10/18 15:44   


 


 


 Carvedilol


  (Coreg Tab)  6.25 mg  BID


 PO  2/8/18 21:00


 3/10/18 20:59  2/10/18 08:14


6.25 MG


 


 Levothyroxine


 Sodium


  (Synthroid Tab)  112 mcg  DAILYBB


 PO  2/9/18 06:00


 3/11/18 06:59  2/10/18 06:36


112 MCG


 


 Ferrous Sulfate


  (Feosol Tab)  325 mg  DAILY


 PO  2/9/18 09:00


 3/11/18 08:59  2/10/18 08:15


325 MG


 


 Miscellaneous


 Information


  (Order Awaiting


 Action)  1 ea  QS


 N/A  2/9/18 00:00


 3/11/18 00:00   


 


 


 Levalbuterol


  (Xopenex 1.25MG/


 3ML Neb)  1.25 mg  Q2H  PRN


 INH  2/8/18 16:00


 3/10/18 15:59   


 


 


 Oseltamivir


 Phosphate


  (Tamiflu Susp)  30 mg  BID


 PO  2/8/18 21:00


 2/13/18 20:59  2/10/18 08:14


30 MG


 


 Prednisone


  (PredniSONE TAB)  40 mg  DAILY


 PO  2/9/18 09:00


 3/11/18 08:59  2/10/18 08:15


40 MG


 


 Ipratropium


 Bromide


  (Atrovent 0.02%


 0.5MG/2.5ML Neb)  0.5 mg  Q6R


 INH  2/8/18 21:00


 3/10/18 20:59  2/10/18 07:18


0.5 MG


 


 Levalbuterol


  (Xopenex 1.25MG/


 0.5ML Neb)  1.25 mg  Q6R


 INH  2/8/18 21:00


 3/10/18 20:59  2/10/18 07:18


1.25 MG


 


 Losartan Potassium


  (coZAAR TAB)  100 mg  DAILY


 PO  2/10/18 09:00


 3/12/18 08:59  2/10/18 09:24


100 MG


 


 Furosemide


  (Lasix Tab)  80 mg  QAM


 PO  2/10/18 09:00


 3/12/18 08:59  2/10/18 09:24


80 MG


 


 Furosemide


  (Lasix Tab)  40 mg  2100  ONCE


 PO  2/10/18 21:00


 2/10/18 21:01   


 











Objective


Vital Signs











  Date Time  Temp Pulse Resp B/P (MAP) Pulse Ox O2 Delivery O2 Flow Rate FiO2


 


2/10/18 08:45      Nasal Cannula 3.0 


 


2/10/18 07:18  77 16  99 Nasal Cannula 2.0 


 


2/10/18 07:16 36.6 68 20 186/84 (118) 94 Nasal Cannula 2.0 


 


2/10/18 01:49  69 16  97 Nasal Cannula 2.0 


 


2/10/18 00:00     95 Nasal Cannula 3.0 


 


2/9/18 22:54 36.4 72 18 177/93 (121) 95 Nasal Cannula 3.0 


 


2/9/18 20:40  69  153/83 (106)    


 


2/9/18 19:27  62 16  95 Nasal Cannula 3.0 


 


2/9/18 17:42  71  164/86 (112)    


 


2/9/18 16:00      Nasal Cannula 2.0 


 


2/9/18 15:15 36.8 64 18 169/95 (119) 96  3.0 


 


2/9/18 14:25  74 16  95 Nasal Cannula 3.0 


 


2/9/18 12:56 36.8 63 18 137/81 (99) 95 Nasal Cannula 2.0 


 


2/9/18 12:50      Nasal Cannula 2.0 











Physical Exam


General Appearance:  WD/WN, no apparent distress


Eyes:  normal inspection, EOMI, sclerae normal


ENT:  normal ENT inspection, hearing grossly normal, pharynx normal


Neck:  supple, no adenopathy, no JVD, trachea midline


Respiratory/Chest:  chest non-tender, normal breath sounds, no respiratory 

distress, no accessory muscle use, + wheezing (mild, end exhalation, mostly 

from upper airway)


Cardiovascular:  regular rate, rhythm, no edema, no gallop, no JVD, no murmur


Abdomen:  normal bowel sounds, non tender, soft, no organomegaly


Extremities:  normal range of motion, non-tender, normal inspection, no pedal 

edema, no calf tenderness, pelvis stable


Neurologic/Psychiatric:  CNs II-XII nml as tested, no motor/sensory deficits, 

alert, normal mood/affect, oriented x 3


Skin:  normal color, warm/dry, no rash





Laboratory Results





Last 24 Hours








Test


  2/9/18


16:13 2/9/18


20:07 2/10/18


06:24 2/10/18


07:42


 


Bedside Glucose 316 mg/dl  268 mg/dl   156 mg/dl 


 


White Blood Count   9.27 K/uL  


 


Red Blood Count   3.77 M/uL  


 


Hemoglobin   11.5 g/dL  


 


Hematocrit   35.6 %  


 


Mean Corpuscular Volume   94.4 fL  


 


Mean Corpuscular Hemoglobin   30.5 pg  


 


Mean Corpuscular Hemoglobin


Concent 


  


  32.3 g/dl 


  


 


 


RDW Standard Deviation   58.5 fL  


 


RDW Coefficient of Variation   16.9 %  


 


Platelet Count   167 K/uL  


 


Mean Platelet Volume   10.8 fL  


 


Sodium Level   138 mmol/L  


 


Potassium Level   3.9 mmol/L  


 


Chloride Level   105 mmol/L  


 


Carbon Dioxide Level   28 mmol/L  


 


Anion Gap   5.0 mmol/L  


 


Blood Urea Nitrogen   31 mg/dl  


 


Creatinine   1.23 mg/dl  


 


Est Creatinine Clear Calc


Drug Dose 


  


  38.7 ml/min 


  


 


 


Estimated GFR (


American) 


  


  47.3 


  


 


 


Estimated GFR (Non-


American 


  


  40.8 


  


 


 


BUN/Creatinine Ratio   25.0  


 


Random Glucose   175 mg/dl  


 


Calcium Level   8.8 mg/dl  


 


Magnesium Level   2.2 mg/dl  


 


Total Bilirubin   0.4 mg/dl  


 


Direct Bilirubin   0.1 mg/dl  


 


Aspartate Amino Transf


(AST/SGOT) 


  


  239 U/L 


  


 


 


Alanine Aminotransferase


(ALT/SGPT) 


  


  274 U/L 


  


 


 


Alkaline Phosphatase   82 U/L  


 


Total Protein   6.5 gm/dl  


 


Albumin   2.8 gm/dl  


 


Globulin   3.7 gm/dl  


 


Albumin/Globulin Ratio   0.8  


 


Test


  2/10/18


11:08 


  


  


 


 


Bedside Glucose 174 mg/dl    











Assessment and Plan


83 y/o female with a history of diastolic CHF, paroxysmal a-fib, HTN, COPD, 

severe restrictive lung disease, DM II, hypothyroidism and chronic gastritis 

who presents with acute respiratory and wheezing as well as a fall.





Acute Hypoxic Respiratory Failure due to Influenza A present on admission and 

COPD Exacerbation/Severe Restrictive Lung Disease--improving


   transferred to medical on 2/9, breathing well, titrated down to 1L NC, 

likely off tomorrow


   Continue Tamiflu 30 mg BID for renal dosing x 5 days.  Day #3 of 5.


   Xopenex/Atrovent nebs


   Prednisone 40 mg qd.  Day #3 will taper on discharge





Possible CAP--likely artifact, already resolving on repeat CXR


- CXR 2/9 shows resolving right upper lobe opacities.  


- No further antibiotics.  No pneumonia symptoms, afebrile, no leukocytosis





Mild Rhabdomyolysis secondary to Mechanical Fall--on floor for 12 hours.  

Improving


- CK 1382 on 2/9, down from 1619.  Received 2L NSS at 80 cc/hr for 3 days, stop 

this AM


- will repeat CK tomorrow, AST and ALT going down





Elevated troponin--resolving, likely secondary to demand ischemia


-Troponin peaked at 0.659, trending down.  No chest pain





Chronic Diastolic CHF, HTN--stable, does not appear fluid overloaded at this 

time


- Continue Coreg 6.25 mg PO BID


- resume Losartan and Lasix today as patient is adequately hydrated and now 

hypertensive





Paroxysmal Atrial Fibrillation--NSR on tele


- H/o amiodarone use, d/c'd due to toxicity.  H/o of AC use but h/o acute 

anemia which is now stable since off AC.  Believed to have GI bleeding





DM II--last HgbA1c 7.4 on 10/6/17


- Hold Glipizide


- Insulin sliding scale


- Check BSGs q ac and qhs





Hypothyroidism


-Continue Synthroid 112 mcg PO qd





Code Status


-Level I, FULL RESUSCITATION STATUS





DVT prophylaxis


-Hold chemical ppx due to h/o GIB


-SCDs





Disposition:


- therapy clear for home with home health, anticipate d/c to home tomorrow

## 2018-02-11 VITALS
OXYGEN SATURATION: 94 % | DIASTOLIC BLOOD PRESSURE: 97 MMHG | HEART RATE: 79 BPM | SYSTOLIC BLOOD PRESSURE: 201 MMHG | TEMPERATURE: 98.06 F

## 2018-02-11 VITALS — HEART RATE: 81 BPM | OXYGEN SATURATION: 96 %

## 2018-02-11 VITALS
OXYGEN SATURATION: 96 % | TEMPERATURE: 98.06 F | DIASTOLIC BLOOD PRESSURE: 73 MMHG | SYSTOLIC BLOOD PRESSURE: 171 MMHG | HEART RATE: 81 BPM

## 2018-02-11 VITALS
DIASTOLIC BLOOD PRESSURE: 73 MMHG | SYSTOLIC BLOOD PRESSURE: 171 MMHG | HEART RATE: 73 BPM | TEMPERATURE: 98.06 F | OXYGEN SATURATION: 92 %

## 2018-02-11 VITALS — DIASTOLIC BLOOD PRESSURE: 72 MMHG | HEART RATE: 75 BPM | SYSTOLIC BLOOD PRESSURE: 136 MMHG

## 2018-02-11 VITALS — HEART RATE: 68 BPM | DIASTOLIC BLOOD PRESSURE: 95 MMHG | SYSTOLIC BLOOD PRESSURE: 189 MMHG

## 2018-02-11 VITALS — OXYGEN SATURATION: 94 %

## 2018-02-11 LAB
ALBUMIN SERPL-MCNC: 3 GM/DL (ref 3.4–5)
ALP SERPL-CCNC: 86 U/L (ref 45–117)
ALT SERPL-CCNC: 211 U/L (ref 12–78)
AST SERPL-CCNC: 121 U/L (ref 15–37)
BUN SERPL-MCNC: 34 MG/DL (ref 7–18)
CALCIUM SERPL-MCNC: 8.6 MG/DL (ref 8.5–10.1)
CO2 SERPL-SCNC: 27 MMOL/L (ref 21–32)
CREAT SERPL-MCNC: 1.23 MG/DL (ref 0.6–1.2)
EOSINOPHIL NFR BLD AUTO: 169 K/UL (ref 130–400)
GLUCOSE SERPL-MCNC: 221 MG/DL (ref 70–99)
HCT VFR BLD CALC: 38.3 % (ref 37–47)
HGB BLD-MCNC: 12.5 G/DL (ref 12–16)
MCH RBC QN AUTO: 30.7 PG (ref 25–34)
MCHC RBC AUTO-ENTMCNC: 32.6 G/DL (ref 32–36)
MCV RBC AUTO: 94.1 FL (ref 80–100)
PMV BLD AUTO: 11 FL (ref 7.4–10.4)
POTASSIUM SERPL-SCNC: 3.3 MMOL/L (ref 3.5–5.1)
PROT SERPL-MCNC: 6.9 GM/DL (ref 6.4–8.2)
RED CELL DISTRIBUTION WIDTH CV: 16.9 % (ref 11.5–14.5)
RED CELL DISTRIBUTION WIDTH SD: 58.3 FL (ref 36.4–46.3)
SODIUM SERPL-SCNC: 139 MMOL/L (ref 136–145)
WBC # BLD AUTO: 8.71 K/UL (ref 4.8–10.8)

## 2018-02-11 RX ADMIN — FERROUS SULFATE TAB EC 325 MG (65 MG FE EQUIVALENT) SCH MG: 325 (65 FE) TABLET DELAYED RESPONSE at 07:42

## 2018-02-11 RX ADMIN — IPRATROPIUM BROMIDE SCH MG: 0.5 SOLUTION RESPIRATORY (INHALATION) at 07:21

## 2018-02-11 RX ADMIN — OSELTAMIVIR PHOSPHATE SCH MG: 6 POWDER, FOR SUSPENSION ORAL at 07:45

## 2018-02-11 RX ADMIN — INSULIN ASPART SCH UNITS: 100 INJECTION, SOLUTION INTRAVENOUS; SUBCUTANEOUS at 12:23

## 2018-02-11 RX ADMIN — ALUMINUM ZIRCONIUM TRICHLOROHYDREX GLY SCH EA: 0.2 STICK TOPICAL at 07:41

## 2018-02-11 RX ADMIN — LEVALBUTEROL SCH MG: 1.25 SOLUTION, CONCENTRATE RESPIRATORY (INHALATION) at 07:20

## 2018-02-11 RX ADMIN — IPRATROPIUM BROMIDE SCH MG: 0.5 SOLUTION RESPIRATORY (INHALATION) at 02:32

## 2018-02-11 RX ADMIN — INSULIN ASPART SCH UNITS: 100 INJECTION, SOLUTION INTRAVENOUS; SUBCUTANEOUS at 06:30

## 2018-02-11 RX ADMIN — LOSARTAN POTASSIUM SCH MG: 50 TABLET, FILM COATED ORAL at 07:42

## 2018-02-11 RX ADMIN — LEVALBUTEROL SCH MG: 1.25 SOLUTION, CONCENTRATE RESPIRATORY (INHALATION) at 02:32

## 2018-02-11 RX ADMIN — CARVEDILOL SCH MG: 6.25 TABLET, FILM COATED ORAL at 07:41

## 2018-02-11 RX ADMIN — LEVOTHYROXINE SODIUM SCH MCG: 112 TABLET ORAL at 06:11

## 2018-02-11 RX ADMIN — FUROSEMIDE SCH MG: 80 TABLET ORAL at 07:41

## 2018-02-12 NOTE — DISCHARGE SUMMARY
Discharge Summary


Date of Service


Feb 11, 2018.





Discharge Summary


Admission Date:


Feb 8, 2018 at 15:45


Discharge Date:  Feb 11, 2018


Discharge Disposition:  Home


Principal Diagnosis:  Influenza A


Problems/Secondary Diagnoses:


Rhabdomyolysis


Acute hypoxic respiratory failure


COPD exacerbation


Immunizations:  


   Have You Had Influenza Vaccine:  Unknown


   History of Tetanus Vaccine?:  Unknown


   History of Pneumococcal:  Unknown


   History of Hepatitis B Vaccine:  Unknown


Procedures:


none


Consultations:


none





Medication Reconciliation


New Medications:  


Albuterol (Ventolin Hfa) 60 Puffs/5400 Mcg Aers


2 PUFFS INH Q6 PRN for SOB/Wheezing, #1 INHALER 1 Refill





Prednisone (Prednisone) 10 Mg Tab


30 MG PO DAILY for 6 Days, #18 TABS 0 Refills


start 2/12, 30mg x 2 days then 20mg x 2 days then 10mg x 2 days then


 stop


Oseltamivir Phosphate (Tamiflu) 6 Mg/Ml Shanice


30 MG PO BID, #5 TABS 0 Refills





 


Continued Medications:  


Ascorbic Acid (Ascorbic Acid) 500 Mg Tab


500 MG PO DAILY, TAB





Atorvastatin (Lipitor) 10 Mg Tab


10 MG PO HS, TAB





Carvedilol (Coreg) 6.25 Mg Tab


6.25 MG PO BID, TAB





Ferrous Sulfate (Ferrous Sulfate) 325 Mg Tab


325 MG PO DAILY





Fluticasone Furoate-Vilanterol (Breo Ellipta) 1 Inh Inh


1 PUFF PO DAILY





Furosemide (Lasix) 40 Mg Tab


120 MG PO DAILY, TAB





Glipizide (Glipizide Er) 10 Mg Tab


10 MG PO BID for 90 Days, #180 TAB 3 Refills





Levothyroxine Sodium (Synthroid) 112 Mcg Tab


112 MCG PO DAILY, TAB





Losartan Potassium (Cozaar) 100 Mg Tab


100 MG PO DAILY, TAB





Magnesium Oxide (Magnesium) 500 Mg Cap


500 MG PO BID











Discharge Exam


Patient feeling well, breathing is stable, mild expiratory wheeze but she has 

this typically.  Discussed therapy recommendation for home with home PT.  She 

refused a referral for home health and home PT/OT. 


Discussed her maintenance inhaler with Breo Elipta, she says she only uses 

sporadically, told her she needs to use every day and wrote on the instructions.


Asked about rescue inhaler, she said she did not think she had one, wrote a 

script for Ventolin.


Eating well, no issues urinating or moving bowels.


Review of Systems:  


   Constitutional:  No fever, No chills, No sweats, No weight loss, No weakness

, No fatigue, No problem reported


   Eyes:  No worsening of vision, No eye pain, No redness, No discharge, No 

diplopia, No problem reported


   ENT:  No hearing loss, No unusual epistaxis, No nasal symptoms, No sore 

throat, No tinnitus, No dental problems, No trouble swallowing, No problem 

reported


   Respiratory:  + cough, + dyspnea on exertion, No sputum, No wheezing, No 

shortness of breath, No dyspnea at rest, No hemoptysis, No problem reported


   Cardiovascular:  No chest pain, No orthopnea, No PND, No edema, No 

claudication, No palpitations, No problem reported


   Abdomen:  No pain, No nausea, No vomiting, No diarrhea, No constipation, No 

GI bleeding, No problem reported


   Musculoskeletal:  No joint pain, No muscle pain, No swelling, No calf pain, 

No problem reported


   Genitourinary - Female:  No dysuria, No urinary frequency, No urinary urgency

, No urinary incontinence


   Neurologic:  No memory loss, No paralysis, No weakness, No numbness/tingling

, No vertigo, No balance problems, No problem reported


   Psychiatric:  No depression symptoms, No anhedonism, No anxiety, No insomnia

, No substance abuse, No problem reported


   Endocrine:  No fatigue, No excessive thirst, No excessive urination, No 

problem reported


   Hematologic / Lymphatic:  No abnormal bleeding/bruising, No clotting problems

, No swollen lymph nodes, No night sweats, No problem reported


   Integumentary:  No rash, No itch, No new/changing skin lesions, No color 

change, No bleeding, No problem reported


Physical Exam:  


   General Appearance:  no apparent distress, + obese


   Eyes:  normal inspection, EOMI, sclerae normal


   ENT:  normal ENT inspection, hearing grossly normal, pharynx normal


   Neck:  supple, no adenopathy, no JVD, trachea midline


   Respiratory/Chest:  chest non-tender, normal breath sounds, no respiratory 

distress, no accessory muscle use, + wheezing (end exhalation, bilaterally)


   Cardiovascular:  regular rate, rhythm, no edema, no gallop, no JVD, no murmur

, normal peripheral pulses


   Abdomen / GI:  normal bowel sounds, non tender, soft, no organomegaly


   Extremities:  normal inspection, no calf tenderness, normal capillary refill

, no pedal edema, normal range of motion, non-tender, pelvis stable


   Neurologic/Psychiatric:  CNs II-XII nml as tested, no motor/sensory deficits

, alert, normal mood/affect, normal reflexes, oriented x 3


   Skin:  normal color, warm/dry, no rash





Hospital Course


83 y/o female with a history of diastolic CHF, paroxysmal a-fib, HTN, COPD, 

severe restrictive lung disease, DM II, hypothyroidism and chronic gastritis 

who presents with acute respiratory and wheezing as well as a fall.





Acute Hypoxic Respiratory Failure due to Influenza A present on admission and 

COPD Exacerbation/Severe Restrictive Lung Disease--acute hypoxia resolved


   breathing well, titrated down to room air


   Continue Tamiflu 30 mg BID for renal dosing x 5 days.  Day #4 of 5.


   Xopenex/Atrovent nebs


   Prednisone 40 mg qd.  taper down to 30mg daily and then taper to off over 

the next 6 days





Possible CAP--likely artifact, already resolving on repeat CXR


- CXR 2/9 shows resolving right upper lobe opacities.  


- No further antibiotics.  No pneumonia symptoms, afebrile, no leukocytosis





Mild Rhabdomyolysis secondary to Mechanical Fall--on floor for 12 hours.  

Improving


- CK 1382 on 2/9, down from 1619.  fluids stopped on 2/10 and Lasix resumed


- CK down to 520 on day of discharge, no muscle pain





Elevated troponin--resolving, likely secondary to demand ischemia


-Troponin peaked at 0.659, trending down.  No chest pain





Chronic Diastolic CHF, HTN--stable, does not appear fluid overloaded at this 

time


- Continue Coreg 6.25 mg PO BID


- resume Losartan and Lasix on 2/10





Paroxysmal Atrial Fibrillation--NSR on tele


- H/o amiodarone use, d/c'd due to toxicity.  H/o of AC use but h/o acute 

anemia which is now stable since off AC.  Believed to have GI bleeding





DM II--last HgbA1c 7.4 on 10/6/17


- resume Glipizide on discharge


- Insulin sliding scale


- Check BSGs q ac and qhs





Hypothyroidism


-Continue Synthroid 112 mcg PO qd





Code Status


-Level I, FULL RESUSCITATION STATUS





DVT prophylaxis


-Hold chemical ppx due to h/o GIB


-SCDs





Disposition:


- d/c to home, patient refuses home nursing or therapy


Total Time Spent:  Greater than 30 minutes


This includes examination of the patient, discharge planning, medication 

reconciliation, and communication with other providers.





Discharge Instructions


Please refer to the electronic Patient Visit Report (Discharge Instructions) 

for additional information.





Follow-Up


Sharan Olsen in one week





Additional Copies To


Sharan Olsen PA-C

## 2021-03-14 NOTE — ANESTHESIOLOGY PROGRESS NOTE
Anesthesia Post Op Note


Date & Time


Jan 23, 2017 at 07:59





Vital Signs


Pain Intensity:  0





 Vital Signs Past 12 Hours








  Date Time  Temp Pulse Resp B/P Pulse Ox O2 Delivery O2 Flow Rate FiO2


 


1/23/17 07:55  71 18 135/78 92 Nasal Cannula 6 


 


1/23/17 07:50  121 18 139/98 92 Nasal Cannula 6 


 


1/23/17 07:45  127 18 160/128 99 Nasal Cannula 6 


 


1/23/17 07:40  130 18 185/115 99 Nasal Cannula 4 


 


1/23/17 07:35  130 18 166/119 99 Nasal Cannula 4 


 


1/23/17 07:02 36.3 124 16 170/125 93 Room Air  











Notes


Mental Status:  alert / awake / arousable, participated in evaluation


Pt Amnestic to Procedure:  Yes


Nausea / Vomiting:  adequately controlled


Pain:  adequately controlled


Airway Patency, RR, SpO2:  stable & adequate


BP & HR:  stable & adequate


Hydration State:  stable & adequate


Anesthetic Complications:  no major complications apparent · She meets sepsis criteria with WBC 16 tachycardia and tachypnea  · Watch for hypotension  · Check CBC and CMP in a m    · Follow inflammatory markers